# Patient Record
Sex: MALE | Race: WHITE | NOT HISPANIC OR LATINO | Employment: OTHER | URBAN - METROPOLITAN AREA
[De-identification: names, ages, dates, MRNs, and addresses within clinical notes are randomized per-mention and may not be internally consistent; named-entity substitution may affect disease eponyms.]

---

## 2017-01-18 ENCOUNTER — PATIENT MESSAGE (OUTPATIENT)
Dept: TRANSPLANT | Facility: CLINIC | Age: 52
End: 2017-01-18

## 2017-01-27 ENCOUNTER — TELEPHONE (OUTPATIENT)
Dept: TRANSPLANT | Facility: CLINIC | Age: 52
End: 2017-01-27

## 2017-01-27 ENCOUNTER — PATIENT MESSAGE (OUTPATIENT)
Dept: TRANSPLANT | Facility: CLINIC | Age: 52
End: 2017-01-27

## 2017-01-27 NOTE — TELEPHONE ENCOUNTER
Left message for call back to discuss when pt will have lab needed for switch to rapamune pending call back

## 2017-02-13 ENCOUNTER — PATIENT MESSAGE (OUTPATIENT)
Dept: TRANSPLANT | Facility: CLINIC | Age: 52
End: 2017-02-13

## 2017-02-17 ENCOUNTER — PATIENT MESSAGE (OUTPATIENT)
Dept: TRANSPLANT | Facility: CLINIC | Age: 52
End: 2017-02-17

## 2017-02-17 ENCOUNTER — TELEPHONE (OUTPATIENT)
Dept: TRANSPLANT | Facility: CLINIC | Age: 52
End: 2017-02-17

## 2017-02-20 LAB
EXT ALBUMIN: 4.3
EXT ALKALINE PHOSPHATASE: 60
EXT ALT: 24
EXT AST: 28
EXT BASOPHIL%: 1.1
EXT BILIRUBIN TOTAL: 0.8
EXT BUN: 16
EXT CALCIUM: 9.3
EXT CHLORIDE: 105
EXT CHOLESTEROL: 148
EXT CO2: 28
EXT CREATININE: 1.7 MG/DL
EXT EOSINOPHIL%: 1.6
EXT GFR MDRD NON AF AMER: 46
EXT GLUCOSE: 89
EXT HDL: 31
EXT HEMATOCRIT: 46.2
EXT HEMOGLOBIN: 15.7
EXT LDL CHOLESTEROL: 29
EXT LYMPH%: 26.3
EXT MONOCYTES%: 8.4
EXT PLATELETS: 144
EXT POTASSIUM: 5
EXT PROTEIN TOTAL: 7.1
EXT SEGS%: 62.6
EXT SODIUM: 139 MMOL/L
EXT TACROLIMUS LVL: 7.1
EXT TRIGLYCERIDES: 145
EXT WBC: 5.4

## 2017-02-23 ENCOUNTER — TELEPHONE (OUTPATIENT)
Dept: TRANSPLANT | Facility: CLINIC | Age: 52
End: 2017-02-23

## 2017-02-23 NOTE — TELEPHONE ENCOUNTER
----- Message from Mercedes Garcia MD sent at 2/21/2017  8:05 AM CST -----  The Labs are stable - please let patient know.

## 2017-02-23 NOTE — TELEPHONE ENCOUNTER
Labs reviewed. Did not receive pro/cre ratio from pt lab . Pt reports he will repeat lab next week, for possible switch to rapamune

## 2017-04-21 ENCOUNTER — PATIENT MESSAGE (OUTPATIENT)
Dept: TRANSPLANT | Facility: CLINIC | Age: 52
End: 2017-04-21

## 2017-04-24 LAB
EXT ALBUMIN: 4.7
EXT ALKALINE PHOSPHATASE: 66
EXT ALT: 25
EXT AST: 19
EXT BASOPHIL%: 0.9
EXT BILIRUBIN TOTAL: 0.7
EXT BUN: 15
EXT CALCIUM: 9.5
EXT CHLORIDE: 104
EXT CHOLESTEROL: 165
EXT CO2: 25
EXT CREATININE: 1.4 MG/DL
EXT EOSINOPHIL%: 1.6
EXT GLUCOSE: 80
EXT HDL: 41
EXT HEMATOCRIT: 48.3
EXT HEMOGLOBIN: 16.6
EXT LDL CHOLESTEROL: 99
EXT LYMPH%: 25.4
EXT MONOCYTES%: 6
EXT PLATELETS: 121
EXT POTASSIUM: 4.8
EXT PROT/CREAT RATIO UR: NORMAL
EXT PROTEIN TOTAL: 7
EXT SEGS%: 66.1
EXT SODIUM: 138 MMOL/L
EXT TACROLIMUS LVL: 5
EXT TRIGLYCERIDES: 126
EXT WBC: 5.9

## 2017-04-25 ENCOUNTER — PATIENT MESSAGE (OUTPATIENT)
Dept: TRANSPLANT | Facility: CLINIC | Age: 52
End: 2017-04-25

## 2017-05-03 ENCOUNTER — PATIENT MESSAGE (OUTPATIENT)
Dept: TRANSPLANT | Facility: CLINIC | Age: 52
End: 2017-05-03

## 2017-05-04 DIAGNOSIS — R79.89 ELEVATED LIVER FUNCTION TESTS: ICD-10-CM

## 2017-05-04 RX ORDER — TACROLIMUS 1 MG/1
3 CAPSULE ORAL EVERY 12 HOURS
Qty: 240 CAPSULE | Refills: 11 | Status: SHIPPED | OUTPATIENT
Start: 2017-05-04 | End: 2017-05-09 | Stop reason: SDUPTHER

## 2017-05-05 NOTE — TELEPHONE ENCOUNTER
----- Message from Mercedes Garcia MD sent at 4/30/2017 10:47 AM CDT -----  The Labs are stable - please let patient know.

## 2017-05-05 NOTE — TELEPHONE ENCOUNTER
Labs reviewed no changes needed  Will continue on prograf per Dr. Sargent will repeat per protocol. Letter sent  Will send message via portal

## 2017-05-08 ENCOUNTER — TELEPHONE (OUTPATIENT)
Dept: TRANSPLANT | Facility: CLINIC | Age: 52
End: 2017-05-08

## 2017-05-08 NOTE — TELEPHONE ENCOUNTER
----- Message from Shelia Lozano sent at 5/8/2017  4:04 PM CDT -----  Contact: patient   Patient wife needs someone to call pharmacy to answer a few questions please call [employer] medic[employer]  or call pt

## 2017-05-08 NOTE — TELEPHONE ENCOUNTER
Called this number per pt spouse request. Pt was thinking PA was needed. When I called no PA was on file.  Advised thatb they reach out to [employer] to find out what is needed. Will f/u in AM

## 2017-05-09 ENCOUNTER — PATIENT MESSAGE (OUTPATIENT)
Dept: TRANSPLANT | Facility: CLINIC | Age: 52
End: 2017-05-09

## 2017-05-09 DIAGNOSIS — R79.89 ELEVATED LIVER FUNCTION TESTS: ICD-10-CM

## 2017-05-09 NOTE — TELEPHONE ENCOUNTER
Dose verification pt taking prograf   3 mg in the morning and 2 mg at night [facility] has been notified

## 2017-05-10 ENCOUNTER — PATIENT MESSAGE (OUTPATIENT)
Dept: TRANSPLANT | Facility: CLINIC | Age: 52
End: 2017-05-10

## 2017-05-10 RX ORDER — TACROLIMUS 1 MG/1
CAPSULE ORAL
Qty: 150 CAPSULE | Refills: 11 | Status: SHIPPED | OUTPATIENT
Start: 2017-05-10 | End: 2020-07-10 | Stop reason: SDUPTHER

## 2017-05-11 ENCOUNTER — TELEPHONE (OUTPATIENT)
Dept: TRANSPLANT | Facility: CLINIC | Age: 52
End: 2017-05-11

## 2017-05-11 NOTE — TELEPHONE ENCOUNTER
----- Message from Maria Antonia Dewey sent at 5/11/2017 12:35 PM CDT -----  Contact: Rj with [facility]   Calling to give update pt's medicine tacrolimus (PROGRAF) 1 MG Cap had been approved and will be until the end of the year the authorization # is [id number] and if needed Rj can be reached at

## 2017-08-10 ENCOUNTER — PATIENT MESSAGE (OUTPATIENT)
Dept: TRANSPLANT | Facility: CLINIC | Age: 52
End: 2017-08-10

## 2017-08-10 ENCOUNTER — TELEPHONE (OUTPATIENT)
Dept: TRANSPLANT | Facility: CLINIC | Age: 52
End: 2017-08-10

## 2017-08-14 ENCOUNTER — PATIENT MESSAGE (OUTPATIENT)
Dept: TRANSPLANT | Facility: CLINIC | Age: 52
End: 2017-08-14

## 2017-08-16 LAB
EXT ALBUMIN: 4.4
EXT ALKALINE PHOSPHATASE: 66
EXT ALT: 43
EXT AST: 29
EXT BASOPHIL%: 1.3
EXT BILIRUBIN TOTAL: 0.7
EXT BUN: 12
EXT CALCIUM: 9.5
EXT CHLORIDE: 104
EXT CO2: 29
EXT CREATININE: 1.4 MG/DL
EXT EOSINOPHIL%: 1.4
EXT GFR MDRD AF AMER: 69
EXT GFR MDRD NON AF AMER: 57
EXT GLUCOSE: 89
EXT HEMATOCRIT: 46.7
EXT HEMOGLOBIN: 16.1
EXT LYMPH%: 31.4
EXT MONOCYTES%: 8.6
EXT PLATELETS: 118
EXT POTASSIUM: 4.9
EXT PROTEIN TOTAL: 7.2
EXT SEGS%: 57.3
EXT SODIUM: 140 MMOL/L
EXT TACROLIMUS LVL: 5.2
EXT WBC: 5.4

## 2017-08-18 ENCOUNTER — PATIENT MESSAGE (OUTPATIENT)
Dept: TRANSPLANT | Facility: CLINIC | Age: 52
End: 2017-08-18

## 2017-08-18 ENCOUNTER — TELEPHONE (OUTPATIENT)
Dept: TRANSPLANT | Facility: CLINIC | Age: 52
End: 2017-08-18

## 2017-08-18 NOTE — TELEPHONE ENCOUNTER
----- Message from Mercedes Garcia MD sent at 8/16/2017  8:40 PM CDT -----  Repeat labs in one month - please let patient know.

## 2017-08-21 ENCOUNTER — PATIENT MESSAGE (OUTPATIENT)
Dept: TRANSPLANT | Facility: CLINIC | Age: 52
End: 2017-08-21

## 2017-08-21 ENCOUNTER — TELEPHONE (OUTPATIENT)
Dept: TRANSPLANT | Facility: CLINIC | Age: 52
End: 2017-08-21

## 2017-08-22 ENCOUNTER — PATIENT MESSAGE (OUTPATIENT)
Dept: TRANSPLANT | Facility: CLINIC | Age: 52
End: 2017-08-22

## 2017-09-15 ENCOUNTER — PATIENT MESSAGE (OUTPATIENT)
Dept: TRANSPLANT | Facility: CLINIC | Age: 52
End: 2017-09-15

## 2017-09-15 LAB
EXT ALBUMIN: 4.3
EXT ALKALINE PHOSPHATASE: 60
EXT ALT: 37
EXT AST: 39
EXT BASOPHIL%: 1.1
EXT BILIRUBIN TOTAL: 0.7
EXT BUN: 26
EXT CALCIUM: 9.5
EXT CHLORIDE: 107
EXT CO2: 25
EXT CREATININE: 1.4 MG/DL
EXT EOSINOPHIL%: 1.3
EXT GFR MDRD AF AMER: 69
EXT GFR MDRD NON AF AMER: 57
EXT GLUCOSE: 80
EXT HEMATOCRIT: 45
EXT HEMOGLOBIN: 15.8
EXT LYMPH%: 27.1
EXT MONOCYTES%: 8.4
EXT PLATELETS: 145
EXT POTASSIUM: 4.8
EXT PROTEIN TOTAL: 6.6
EXT SEGS%: 62.1
EXT SODIUM: 138 MMOL/L
EXT TACROLIMUS LVL: 5.2
EXT WBC: 4.7

## 2017-09-18 ENCOUNTER — TELEPHONE (OUTPATIENT)
Dept: TRANSPLANT | Facility: CLINIC | Age: 52
End: 2017-09-18

## 2017-09-18 NOTE — TELEPHONE ENCOUNTER
----- Message from Mercedes Garcia MD sent at 9/17/2017 11:25 AM CDT -----  The Labs are stable - please let patient know.

## 2017-10-30 ENCOUNTER — OFFICE VISIT (OUTPATIENT)
Dept: TRANSPLANT | Facility: CLINIC | Age: 52
End: 2017-10-30
Payer: MEDICARE

## 2017-10-30 VITALS
BODY MASS INDEX: 29.56 KG/M2 | HEIGHT: 72 IN | TEMPERATURE: 98 F | SYSTOLIC BLOOD PRESSURE: 128 MMHG | OXYGEN SATURATION: 99 % | RESPIRATION RATE: 16 BRPM | WEIGHT: 218.25 LBS | DIASTOLIC BLOOD PRESSURE: 76 MMHG | HEART RATE: 57 BPM

## 2017-10-30 DIAGNOSIS — Z94.4 STATUS POST LIVER TRANSPLANT: ICD-10-CM

## 2017-10-30 DIAGNOSIS — Z29.89 PROPHYLACTIC IMMUNOTHERAPY: ICD-10-CM

## 2017-10-30 DIAGNOSIS — Z94.4 S/P LIVER TRANSPLANT: Primary | ICD-10-CM

## 2017-10-30 DIAGNOSIS — I15.8 OTHER SECONDARY HYPERTENSION: Chronic | ICD-10-CM

## 2017-10-30 DIAGNOSIS — R79.89 ELEVATED LIVER FUNCTION TESTS: ICD-10-CM

## 2017-10-30 DIAGNOSIS — Z79.52 LONG TERM CURRENT USE OF SYSTEMIC STEROIDS: ICD-10-CM

## 2017-10-30 PROCEDURE — 99999 PR PBB SHADOW E&M-EST. PATIENT-LVL IV: CPT | Mod: PBBFAC,,, | Performed by: INTERNAL MEDICINE

## 2017-10-30 PROCEDURE — 99215 OFFICE O/P EST HI 40 MIN: CPT | Mod: S$PBB,,, | Performed by: INTERNAL MEDICINE

## 2017-10-30 PROCEDURE — 99214 OFFICE O/P EST MOD 30 MIN: CPT | Mod: PBBFAC | Performed by: INTERNAL MEDICINE

## 2017-10-30 RX ORDER — MYCOPHENOLATE MOFETIL 250 MG/1
500 CAPSULE ORAL 2 TIMES DAILY
Qty: 240 CAPSULE | Refills: 11 | Status: SHIPPED | OUTPATIENT
Start: 2017-10-30 | End: 2020-07-10 | Stop reason: SDUPTHER

## 2017-10-30 NOTE — PATIENT INSTRUCTIONS
1. Colonoscopy 2016 with 3 small polyps- repeat 2019  2. Bone density this year, 2017  3. Dermatologist this year 2017- precancerous lesions- return in 2018  4. Lower cellcept to 750 mg twice daily x 4 weeks then lower to 500 mg twice daily and then hold. Do labs monthly.  Return 1 year

## 2017-10-30 NOTE — PROGRESS NOTES
Transplant Hepatology  Liver Transplant Recipient Follow-up    Transplant Date: 2013  UNOS Native Liver Dx: Alcoholic Cirrhosis    Domenico is here for follow up of his liver transplant.    ORGAN: LIVER  Whole or Partial: whole liver  Donor Type:  - brain death  CDC High Risk: no  Donor CMV Status: positive  Donor HCV Status: negative  Donor HBcAb: negative  Biliary Anastomosis: end to end  Arterial Anatomy: standard  IVC reconstruction: end to end ivc  Portal vein status: patent    He has had the following complications since transplant: renal insufficiency.     Subjective:     Interval History: Domenico is now 4 years post liver transplant. Currently, he is doing well. He feels well overall. He did fracture his ankle in the past year but did not need surgery. During his recovery he gained a lot of weight. He was up to 243 lbs and is now down to 218. He had a bone density in  that was normal. He saw a dermatologist and had precancerous lesion removed from his face. He had a colonoscopy in 2016 and had 3 small polyps removed. A repeat is recommended in 2019..    He is not drinking alcohol. His liver tests were very low (ALT 25, AST 19) and then increased to 43/29. Most recnetly they were 37/39. He is maintained of prograf with a trough ~5 and cellcept 1000 mg bid. I have been hesitant to lower it because he intermittently has elevated liver tests.    His creatinine is fluctuating but now is low at 1.4. He is seeing a local nephrologist.    Review of Systems   Constitutional: Negative.    HENT: Negative.    Eyes: Negative.    Respiratory: Negative.    Cardiovascular: Negative.    Gastrointestinal: Negative.    Genitourinary: Negative.    Musculoskeletal: Negative.    Skin: Negative.    Neurological: Negative.    Psychiatric/Behavioral: Negative.        Objective:     Physical Exam   Constitutional: He is oriented to person, place, and time. He appears well-developed and well-nourished.    HENT:   Head: Normocephalic and atraumatic.   Eyes: Conjunctivae and EOM are normal. Pupils are equal, round, and reactive to light. No scleral icterus.   Neck: Normal range of motion. Neck supple. No thyromegaly present.   Cardiovascular: Normal rate, regular rhythm and normal heart sounds.    Pulmonary/Chest: Effort normal and breath sounds normal. He has no rales.   Abdominal: Soft. Bowel sounds are normal. He exhibits no distension and no mass. There is no tenderness.   Musculoskeletal: Normal range of motion. He exhibits no edema.   Neurological: He is alert and oriented to person, place, and time.   Skin: Skin is warm and dry. No rash noted.   Psychiatric: He has a normal mood and affect.   Vitals reviewed.    Lab Results   Component Value Date    BILITOT 0.5 06/15/2015    AST 38 01/25/2016    ALT 37 01/25/2016    ALKPHOS 104 06/15/2015    CREATININE 1.9 01/25/2016    ALBUMIN 4.1 01/25/2016     Lab Results   Component Value Date    WBC 5.2 01/25/2016    HGB 16.2 01/25/2016    HCT 47 01/25/2016    HCT 26 (L) 06/05/2013     (L) 06/15/2015     Lab Results   Component Value Date    TACROLIMUS 6.7 06/15/2015       Assessment/Plan:     No diagnosis found.Current recommendations:  1. Complication of liver transplant: CRI, ongoing: continue to minimize prograf. Previously have considered changing to rapamune, but this was deferred since his liver enzymes have been elevated on several occasions. target prograf level 3-5.  3. HTN, ongoing: continue antihypertensives  5. S/p liver transplant and control of IS: continue current dose of pg but will lower cellcept especially since he had a precancerous skin lesion on his face. I have asked him to take 750 mg bid x 4 weeks and then 500 mg bid. He should do monthly labs x 6 months.  6. Health maintenance: the patient should continue to see a dermatologist annually to screen for skin cancer. He should repeat a colonoscopy in 2019.  7. R/O osteoporosis. Repeat bone  density now (2017).  8. Alcohol abuse in remission: check peth test periodically    Return one year..    MD ORQUIDEA Coffey Patient Status  Functional Status: 100% - Normal, no complaints, no evidence of disease  Physical Capacity: No Limitations    . .

## 2017-10-30 NOTE — LETTER
October 30, 2017        Ron Saeed III  [address]  [facility] [address]  Phone: [phone]  Fax: [phone]             [facility]  [address]  [facility] [address]  Phone: [phone]   Patient: Domenico Trevino   MR Number: [id number]   YOB: 1965   Date of Visit: 10/30/2017       Dear Dr. Ron Saeed III    Thank you for referring Domenico Trevino to me for evaluation. Attached you will find relevant portions of my assessment and plan of care.    If you have questions, please do not hesitate to call me. I look forward to following Domenico Trevino along with you.    Sincerely,    Mercedes Garcia MD    Enclosure    If you would like to receive this communication electronically, please contact [email or web address] or [phone] to request [employer] access.    [employer] is a tool which provides read-only access to select patient information with whom you have a relationship. Its easy to use and provides real time access to review your patients record including encounter summaries, notes, results, and demographic information.    If you feel you have received this communication in error or would no longer like to receive these types of communications, please e-mail [email or web address]

## 2017-12-15 LAB
EXT ALBUMIN: 4.1
EXT ALKALINE PHOSPHATASE: 50
EXT ALT: 27
EXT AST: 19
EXT BASOPHIL%: 1.3
EXT BILIRUBIN TOTAL: 0.8
EXT BUN: 13
EXT CALCIUM: 9.2
EXT CHLORIDE: 104
EXT CO2: 28
EXT CREATININE: 1.4 MG/DL
EXT EOSINOPHIL%: 1.2
EXT GLUCOSE: 83
EXT HEMATOCRIT: 44.3
EXT HEMOGLOBIN: 15.3
EXT LYMPH%: 27.2
EXT MONOCYTES%: 7.8
EXT PLATELETS: 146
EXT POTASSIUM: 5.2
EXT PROTEIN TOTAL: 6.2
EXT SEGS%: 62.5
EXT SODIUM: 140 MMOL/L
EXT TACROLIMUS LVL: 5.4
EXT WBC: 4.8

## 2017-12-20 ENCOUNTER — TELEPHONE (OUTPATIENT)
Dept: TRANSPLANT | Facility: CLINIC | Age: 52
End: 2017-12-20

## 2017-12-20 NOTE — TELEPHONE ENCOUNTER
----- Message from Mercedes Garcia MD sent at 12/20/2017  3:08 PM CST -----  The Labs are stable - please let patient know.

## 2018-01-08 ENCOUNTER — PATIENT MESSAGE (OUTPATIENT)
Dept: TRANSPLANT | Facility: CLINIC | Age: 53
End: 2018-01-08

## 2018-02-16 LAB
EXT ALBUMIN: 4.5
EXT ALKALINE PHOSPHATASE: 54
EXT ALT: 26
EXT AST: 20
EXT BASOPHIL%: 0.7
EXT BILIRUBIN TOTAL: 0.7
EXT BUN: 19
EXT CALCIUM: 9.4
EXT CHLORIDE: 107
EXT CO2: 26
EXT CREATININE: 1.3 MG/DL
EXT EOSINOPHIL%: 2.3
EXT GFR MDRD AF AMER: >60
EXT GFR MDRD NON AF AMER: >60
EXT GGT: 24
EXT GLUCOSE: 82
EXT HEMATOCRIT: 46.6
EXT HEMOGLOBIN: 15.9
EXT LYMPH%: 25
EXT MONOCYTES%: 7.2
EXT PLATELETS: 132
EXT POTASSIUM: 4.8
EXT PROTEIN TOTAL: 6.5
EXT SEGS%: 64.8
EXT SODIUM: 140 MMOL/L
EXT TACROLIMUS LVL: 3.7
EXT WBC: 4.4

## 2018-02-21 ENCOUNTER — TELEPHONE (OUTPATIENT)
Dept: TRANSPLANT | Facility: CLINIC | Age: 53
End: 2018-02-21

## 2018-02-21 NOTE — TELEPHONE ENCOUNTER
----- Message from Mercedes Garcia MD sent at 2/17/2018  5:21 PM CST -----  The Labs are stable - please let patient know.

## 2018-03-26 ENCOUNTER — TELEPHONE (OUTPATIENT)
Dept: TRANSPLANT | Facility: CLINIC | Age: 53
End: 2018-03-26

## 2018-04-04 ENCOUNTER — TELEPHONE (OUTPATIENT)
Dept: TRANSPLANT | Facility: CLINIC | Age: 53
End: 2018-04-04

## 2018-04-04 NOTE — TELEPHONE ENCOUNTER
Called pt no answer called pt lab lab states pt has not been will send miss lab letter and schedule pt for 2 weeks out

## 2018-04-04 NOTE — LETTER
April 4, 2018    Domenico Trevino  [address] [facility] [address]          Dear Domenico Trevino:  MRN: [id number]    Your lab work was due to be drawn on 04/02/2018.  We contacted your lab and were unable to get test results.  It is very important to get your labs drawn as scheduled.  We cannot monitor you for rejection, infections, or drug toxicity side effects without lab results.  Please call us at [phone] as soon as possible to let us know when you plan to have labs drawn.    Sincerely,        Your Liver Transplant Coordinator    [facility]  [address] [place] [address]  [phone]

## 2018-04-05 LAB
EXT ALBUMIN: 4.4
EXT ALKALINE PHOSPHATASE: 71
EXT ALT: 25
EXT AST: 19
EXT BASOPHIL%: 1.4
EXT BILIRUBIN TOTAL: 0.5
EXT BUN: 19
EXT CALCIUM: 9
EXT CHLORIDE: 105
EXT CO2: 28
EXT CREATININE: 1.5 MG/DL
EXT EOSINOPHIL%: 2.7
EXT GLUCOSE: 91
EXT HEMATOCRIT: 45.6
EXT HEMOGLOBIN: 15.9
EXT LYMPH%: 28.8
EXT MONOCYTES%: 7.2
EXT PLATELETS: 130
EXT POTASSIUM: 4.9
EXT PROTEIN TOTAL: 6.8
EXT SEGS%: 59.9
EXT SODIUM: 136 MMOL/L
EXT TACROLIMUS LVL: 3.7
EXT WBC: 5.4

## 2018-04-09 ENCOUNTER — TELEPHONE (OUTPATIENT)
Dept: TRANSPLANT | Facility: CLINIC | Age: 53
End: 2018-04-09

## 2018-04-09 NOTE — TELEPHONE ENCOUNTER
----- Message from Mercedes Garcia MD sent at 4/8/2018  4:48 PM CDT -----  The Labs are stable - please let patient know.

## 2018-05-31 LAB — EXT WBC: NORMAL

## 2018-06-25 ENCOUNTER — PATIENT MESSAGE (OUTPATIENT)
Dept: TRANSPLANT | Facility: CLINIC | Age: 53
End: 2018-06-25

## 2018-07-09 ENCOUNTER — PATIENT MESSAGE (OUTPATIENT)
Dept: TRANSPLANT | Facility: CLINIC | Age: 53
End: 2018-07-09

## 2018-07-24 ENCOUNTER — PATIENT MESSAGE (OUTPATIENT)
Dept: TRANSPLANT | Facility: CLINIC | Age: 53
End: 2018-07-24

## 2018-07-24 ENCOUNTER — TELEPHONE (OUTPATIENT)
Dept: TRANSPLANT | Facility: CLINIC | Age: 53
End: 2018-07-24

## 2018-07-24 NOTE — TELEPHONE ENCOUNTER
Pt has been called no answer and unable to leave VM. I have sent pt a portal message to go have labs drawn.

## 2018-07-26 ENCOUNTER — PATIENT MESSAGE (OUTPATIENT)
Dept: TRANSPLANT | Facility: CLINIC | Age: 53
End: 2018-07-26

## 2018-07-26 LAB
EXT ALBUMIN: 4.2
EXT ALKALINE PHOSPHATASE: 60
EXT ALT: 28
EXT AST: 21
EXT BASOPHIL%: 1.2
EXT BILIRUBIN TOTAL: 0.4
EXT BUN: 12
EXT CALCIUM: 9.3
EXT CHLORIDE: 104
EXT CO2: 28
EXT CREATININE: 1.2 MG/DL
EXT EOSINOPHIL%: 1.2
EXT GLUCOSE: 81
EXT HEMATOCRIT: 45.4
EXT HEMOGLOBIN: 15.7
EXT LYMPH%: 25.2
EXT MONOCYTES%: 7.1
EXT PLATELETS: 174
EXT POTASSIUM: 4.4
EXT PROTEIN TOTAL: 6.6
EXT SEGS%: 65.3
EXT SODIUM: 139 MMOL/L
EXT TACROLIMUS LVL: 3.9
EXT WBC: 5.2

## 2018-08-02 ENCOUNTER — PATIENT MESSAGE (OUTPATIENT)
Dept: TRANSPLANT | Facility: CLINIC | Age: 53
End: 2018-08-02

## 2018-08-03 ENCOUNTER — TELEPHONE (OUTPATIENT)
Dept: TRANSPLANT | Facility: CLINIC | Age: 53
End: 2018-08-03

## 2018-08-03 NOTE — TELEPHONE ENCOUNTER
----- Message from Alisha Rodrigez MD sent at 8/2/2018  8:36 PM CDT -----  Liver tests normal, prograf adequate.  No change in dosing and repeat labs per protocol

## 2018-11-09 ENCOUNTER — TELEPHONE (OUTPATIENT)
Dept: TRANSPLANT | Facility: CLINIC | Age: 53
End: 2018-11-09

## 2018-11-27 ENCOUNTER — PATIENT MESSAGE (OUTPATIENT)
Dept: TRANSPLANT | Facility: CLINIC | Age: 53
End: 2018-11-27

## 2018-11-28 ENCOUNTER — TELEPHONE (OUTPATIENT)
Dept: TRANSPLANT | Facility: CLINIC | Age: 53
End: 2018-11-28

## 2018-11-28 ENCOUNTER — PATIENT MESSAGE (OUTPATIENT)
Dept: TRANSPLANT | Facility: CLINIC | Age: 53
End: 2018-11-28

## 2018-11-28 LAB
EXT ALBUMIN: 4.6
EXT ALKALINE PHOSPHATASE: 66
EXT ALT: 29
EXT AST: 21
EXT BASOPHIL%: 0.9
EXT BILIRUBIN TOTAL: 1
EXT BUN: 18
EXT CALCIUM: 9.6
EXT CHLORIDE: 103
EXT CO2: 27
EXT CREATININE: 1.25 MG/DL
EXT EOSINOPHIL%: 1.7
EXT GFR MDRD AF AMER: >60
EXT GFR MDRD NON AF AMER: >60
EXT GGT: 30
EXT GLUCOSE: 89
EXT HEMATOCRIT: 48.4
EXT HEMOGLOBIN: 16.5
EXT LYMPH%: 23.8
EXT MONOCYTES%: 6.6
EXT PLATELETS: 169
EXT POTASSIUM: 5.6
EXT PROTEIN TOTAL: 7.3
EXT SEGS%: 67
EXT SODIUM: 139 MMOL/L
EXT TACROLIMUS LVL: 3.7
EXT WBC: 4.9

## 2018-11-28 NOTE — TELEPHONE ENCOUNTER
----- Message from Mercedes Garcia MD sent at 11/28/2018 11:55 AM CST -----  The Labs are stable - please let patient know.

## 2019-02-15 ENCOUNTER — PATIENT MESSAGE (OUTPATIENT)
Dept: TRANSPLANT | Facility: CLINIC | Age: 54
End: 2019-02-15

## 2019-03-27 ENCOUNTER — PATIENT MESSAGE (OUTPATIENT)
Dept: TRANSPLANT | Facility: CLINIC | Age: 54
End: 2019-03-27

## 2019-03-27 LAB
EXT ALBUMIN: 4.6
EXT ALKALINE PHOSPHATASE: 74
EXT ALT: 37
EXT AST: 46
EXT BASOPHIL%: 1
EXT BILIRUBIN TOTAL: 0.5
EXT BUN: 18
EXT CALCIUM: 9.3
EXT CHLORIDE: 106
EXT CHOLESTEROL: 176
EXT CO2: 24
EXT CREATININE: 1.47 MG/DL
EXT EOSINOPHIL%: 3
EXT GFR MDRD NON AF AMER: 54
EXT GLUCOSE: 90
EXT HDL: 42
EXT HEMATOCRIT: 46.6
EXT HEMOGLOBIN: 15.3
EXT LDL CHOLESTEROL: 98
EXT LYMPH%: 24
EXT MAGNESIUM: 2
EXT MONOCYTES%: 10
EXT PLATELETS: 191
EXT POTASSIUM: 4.4
EXT PROTEIN TOTAL: 6.8
EXT SEGS%: 62
EXT SODIUM: 144 MMOL/L
EXT TACROLIMUS LVL: 2.7
EXT TRIGLYCERIDES: 180
EXT WBC: 5.4

## 2019-03-28 ENCOUNTER — TELEPHONE (OUTPATIENT)
Dept: TRANSPLANT | Facility: CLINIC | Age: 54
End: 2019-03-28

## 2019-03-28 NOTE — TELEPHONE ENCOUNTER
----- Message from Mercedes Garcia MD sent at 3/28/2019  8:28 AM CDT -----  Repeat labs - please let patient know.

## 2019-05-24 ENCOUNTER — PATIENT MESSAGE (OUTPATIENT)
Dept: TRANSPLANT | Facility: CLINIC | Age: 54
End: 2019-05-24

## 2019-05-24 ENCOUNTER — TELEPHONE (OUTPATIENT)
Dept: TRANSPLANT | Facility: CLINIC | Age: 54
End: 2019-05-24

## 2019-05-28 LAB
EXT ALBUMIN: 4.1
EXT ALKALINE PHOSPHATASE: 52
EXT ALT: 32
EXT AST: 23
EXT BASOPHIL%: 1.4
EXT BILIRUBIN TOTAL: 0.5
EXT BUN: 17
EXT CALCIUM: 8.8
EXT CHLORIDE: 105
EXT CO2: 24
EXT CREATININE: 1.41 MG/DL
EXT EOSINOPHIL%: 1.5
EXT GLUCOSE: 86
EXT HEMATOCRIT: 49.1
EXT HEMOGLOBIN: 16.5
EXT LYMPH%: 22
EXT MAGNESIUM: 2.1
EXT MONOCYTES%: 7.2
EXT PLATELETS: 202
EXT POTASSIUM: 4.8
EXT PROTEIN TOTAL: 6.6
EXT SEGS%: 67.9
EXT SODIUM: 139 MMOL/L
EXT TACROLIMUS LVL: 4.5
EXT WBC: 5.6

## 2019-05-31 ENCOUNTER — TELEPHONE (OUTPATIENT)
Dept: TRANSPLANT | Facility: CLINIC | Age: 54
End: 2019-05-31

## 2019-05-31 NOTE — TELEPHONE ENCOUNTER
----- Message from Alisha Rodrigez MD sent at 5/31/2019 10:56 AM CDT -----  Liver tests normal, prograf adequate.  Repeat labs per protocol

## 2019-08-17 ENCOUNTER — PATIENT MESSAGE (OUTPATIENT)
Dept: TRANSPLANT | Facility: CLINIC | Age: 54
End: 2019-08-17

## 2019-10-23 ENCOUNTER — TELEPHONE (OUTPATIENT)
Dept: TRANSPLANT | Facility: CLINIC | Age: 54
End: 2019-10-23

## 2019-10-23 NOTE — LETTER
October 23, 2019    Domenico Trevino  [address] [facility] [address]          Dear Domenico Trevino:  MRN: [id number]    Your lab work was due to be drawn on 09/30/2019.  We contacted your lab and were unable to get test results.  It is very important to get your labs drawn as scheduled.  We cannot monitor you for rejection, infections, or drug toxicity side effects without lab results.  Please call us at [phone] as soon as possible to let us know when you plan to have labs drawn.    Sincerely,        Your Liver Transplant Coordinator    [facility]  [address] [place] [address]  [phone]

## 2019-10-23 NOTE — TELEPHONE ENCOUNTER
Pleased advise I have called both labs ( [facility] ) both states pt has not been to lab since may 2019. Tried calling pt unable to reach him and can not leave a VM will send out missed lab letter and reminder card for December.

## 2019-11-06 LAB
EXT ALBUMIN: 4
EXT ALKALINE PHOSPHATASE: 94
EXT ALT: 25
EXT AST: 18
EXT BASOPHIL%: 1.3
EXT BILIRUBIN TOTAL: 0.5
EXT BUN: 15
EXT CALCIUM: 9.4
EXT CHLORIDE: 105
EXT CO2: 28
EXT CREATININE: 1.34 MG/DL
EXT EOSINOPHIL%: 2.3
EXT GGT: 29
EXT GLUCOSE: 94
EXT HEMATOCRIT: 44.9
EXT HEMOGLOBIN: 15.8
EXT LYMPH%: 23.2
EXT MONOCYTES%: 8.6
EXT PLATELETS: 172
EXT POTASSIUM: 4.9
EXT PROTEIN TOTAL: 7
EXT SEGS%: 66.6
EXT SODIUM: 139 MMOL/L
EXT TACROLIMUS LVL: 3.4
EXT WBC: 5.4

## 2019-11-07 ENCOUNTER — PATIENT MESSAGE (OUTPATIENT)
Dept: TRANSPLANT | Facility: CLINIC | Age: 54
End: 2019-11-07

## 2019-11-08 ENCOUNTER — TELEPHONE (OUTPATIENT)
Dept: TRANSPLANT | Facility: CLINIC | Age: 54
End: 2019-11-08

## 2019-11-08 NOTE — TELEPHONE ENCOUNTER
----- Message from Mercedes Garcia MD sent at 11/6/2019  7:29 PM CST -----  The Labs are stable - please let patient know.

## 2019-11-08 NOTE — TELEPHONE ENCOUNTER
Sara Trevino,    Dr Garcia reviewed your labs and they are stable. No medication changes. Next labs due 2/10/2020.  '    Happy Holidays,    Jennifer RN

## 2019-11-08 NOTE — LETTER
November 8, 2019    Domenico Trevino  [address] [facility] [address]          Dear Domenico Trevino:  MRN: [id number]    This is a follow up to your recent labs, your lab results were stable.  There are no medicine changes.  Please have your labs drawn again on 2/10/2020.      If you cannot have your labs drawn on the scheduled date, it is your responsibility to call the transplant department to reschedule.  To reschedule or make an appointment, please as to speak to or leave a message for my assistant, Jackie Spence or Jennifer, at [phone].  When leaving a message for Jackie Spence Angela or myself, we ask that you leave a brief message regarding your request.    Sincerely,      Jennifer Pittman, RN, BSN, CCTC  Your Liver Transplant Coordinator    [facility]  [address] [place] [address]  [phone]

## 2020-01-15 ENCOUNTER — NURSE TRIAGE (OUTPATIENT)
Dept: ADMINISTRATIVE | Facility: CLINIC | Age: 55
End: 2020-01-15

## 2020-01-16 NOTE — TELEPHONE ENCOUNTER
Reason for Disposition   [1] Depression AND [2] unable to do any of normal activities (e.g., self care, school, work; in comparison to baseline).    Additional Information   Negative: Patient attempted suicide   Negative: Patient is threatening suicide now   Negative: Violent behavior, or threatening to physically hurt or kill someone   Negative: [1] Patient is very confused (disoriented, slurred speech) AND [2] no other adult (e.g., friend or family member) available   Negative: [1] Difficult to awaken or acting very confused (disoriented, slurred speech) AND [2] new onset   Negative: Sounds like a life-threatening emergency to the triager    Protocols used: DEPRESSION-A-AH    Liver transplant 6/5/2013, no bpa    Severely depressed and taking too much trazadone (doubling and tripling doses to sleep). His pcp is no longer seeing his patients and the wait in the area to see a mental health provider is backed up until 5/2020. Hospitals in the [place] are crowded with the flu patients and he could not wait more than 5 hours today although he tried. She states he was not making sense the last couple of days and falling and complaining he cannot sleep. Mrs. Richards is guarding the medications to deny him access until it is time for his usual doses.  Mrs. Trevino was advised to go back to the ED with Mr. Trevino and she was given the number to the suicide prevention hotline.

## 2020-02-17 ENCOUNTER — TELEPHONE (OUTPATIENT)
Dept: TRANSPLANT | Facility: CLINIC | Age: 55
End: 2020-02-17

## 2020-02-19 ENCOUNTER — TELEPHONE (OUTPATIENT)
Dept: TRANSPLANT | Facility: CLINIC | Age: 55
End: 2020-02-19

## 2020-02-19 NOTE — LETTER
February 19, 2020    Domenico Trevino  [address] [facility] [address]          Dear Domenico Trevino:  MRN: [id number]    Your lab work was due to be drawn on 2/10/2020.  We contacted your lab and were unable to get test results.  It is very important to get your labs drawn as scheduled.  We cannot monitor you for rejection, infections, or drug toxicity side effects without lab results.  Please call us at [phone] as soon as possible to let us know when you plan to have labs drawn. Please call Jennifer Guzmán at the above number to let us know when you will be completing labs.    Sincerely,      Jennifer Pittman, RN, BSN, CCTC  Your Liver Transplant Coordinator    [facility]  [address] [place] [address]  [phone]

## 2020-02-20 ENCOUNTER — PATIENT MESSAGE (OUTPATIENT)
Dept: TRANSPLANT | Facility: CLINIC | Age: 55
End: 2020-02-20

## 2020-02-21 LAB
EXT ALBUMIN: 4.4
EXT ALKALINE PHOSPHATASE: 86
EXT ALT: 31
EXT AST: 22
EXT BASOPHIL%: 0.7
EXT BILIRUBIN TOTAL: 0.8
EXT BUN: 18
EXT CALCIUM: 10
EXT CHLORIDE: 106
EXT CO2: 26
EXT CREATININE: 1.61 MG/DL
EXT EOSINOPHIL%: 1.3
EXT GGT: 43
EXT GLUCOSE: 90
EXT HEMATOCRIT: 43.5
EXT HEMOGLOBIN: 15.4
EXT LYMPH%: 14.8
EXT MONOCYTES%: 7.4
EXT PLATELETS: 184
EXT POTASSIUM: 4.6
EXT PROTEIN TOTAL: 7.6
EXT SEGS%: 75.8
EXT SODIUM: 139 MMOL/L
EXT TACROLIMUS LVL: 2.5
EXT WBC: 8.8

## 2020-03-05 ENCOUNTER — TELEPHONE (OUTPATIENT)
Dept: TRANSPLANT | Facility: CLINIC | Age: 55
End: 2020-03-05

## 2020-03-05 NOTE — TELEPHONE ENCOUNTER
----- Message from Rakesh Henry MD sent at 2/21/2020 12:45 PM CST -----  Labs stable, tacrolimus level is pending. Please let me know when tacrolimus level is back.

## 2020-03-05 NOTE — TELEPHONE ENCOUNTER
Sara Trevino,    Dr Henry reviewed your labs and they are stable. No medication changes. Next labs due Tuesday, 5/26/20.    Thanks,    Jennifer

## 2020-03-05 NOTE — LETTER
March 5, 2020    Domenico Trevino  [address] [facility] [address]          Dear Domenico Trevino:  MRN: [id number]    This is a follow up to your recent labs, your lab results were stable.  There are no medicine changes.  Please have your labs drawn again on Tuesday, 5/26/2020.      If you cannot have your labs drawn on the scheduled date, it is your responsibility to call the transplant department to reschedule.  To reschedule or make an appointment, please as to speak to or leave a message for my assistant, Jackie Spence or Jennifer, at [phone].  When leaving a message for Jackie Spence Angela or myself, we ask that you leave a brief message regarding your request.    Sincerely,      Jennifer Pittman, RN, BSN, CCTC  Your Liver Transplant Coordinator    [facility]  [address] [place] [address]  [phone]

## 2020-03-05 NOTE — TELEPHONE ENCOUNTER
----- Message from Rakesh Henry MD sent at 2/27/2020  2:41 PM CST -----  No change. Tacro level is low but would not increase as he has been on MMF and renal function is declining.

## 2020-03-12 ENCOUNTER — PATIENT MESSAGE (OUTPATIENT)
Dept: TRANSPLANT | Facility: CLINIC | Age: 55
End: 2020-03-12

## 2020-05-28 ENCOUNTER — TELEPHONE (OUTPATIENT)
Dept: TRANSPLANT | Facility: CLINIC | Age: 55
End: 2020-05-28

## 2020-06-09 ENCOUNTER — TELEPHONE (OUTPATIENT)
Dept: TRANSPLANT | Facility: CLINIC | Age: 55
End: 2020-06-09

## 2020-06-11 LAB
EXT ALBUMIN: 4.3
EXT ALKALINE PHOSPHATASE: 73
EXT ALT: 26
EXT AST: 17
EXT BASOPHIL%: 1.3
EXT BILIRUBIN TOTAL: 0.5
EXT BUN: 18
EXT CALCIUM: 9.9
EXT CHLORIDE: 106
EXT CO2: 23
EXT CREATININE: 1.39 MG/DL
EXT EOSINOPHIL%: 1.4
EXT GGT: 39
EXT GLUCOSE: 92
EXT HEMATOCRIT: 43
EXT HEMOGLOBIN: 15.1
EXT LYMPH%: 24.7
EXT MONOCYTES%: 8.6
EXT PLATELETS: 187
EXT POTASSIUM: 4.6
EXT PROTEIN TOTAL: 7.5
EXT SEGS%: 64
EXT SODIUM: 140 MMOL/L
EXT TACROLIMUS LVL: 4.3
EXT WBC: 5.3

## 2020-06-17 ENCOUNTER — TELEPHONE (OUTPATIENT)
Dept: TRANSPLANT | Facility: CLINIC | Age: 55
End: 2020-06-17

## 2020-06-17 NOTE — LETTER
June 17, 2020    Domenico Trevino  [address] [facility] [address]          Dear Domenico Trevino:  MRN: [id number]    This is a follow up to your recent labs, your lab results were stable.  There are no medicine changes.  Please have your labs drawn again on 9/14/2020.      If you cannot have your labs drawn on the scheduled date, it is your responsibility to call the transplant department to reschedule.  Please call [phone] and ask to speak to Jennifer LEACH -  for all scheduling requests.     Sincerely,      Jennifer Pittman, BSN, RN ,CCTC  Your Liver Transplant Coordinator    [facility]  [address] [place] [address]  [phone]

## 2020-06-17 NOTE — TELEPHONE ENCOUNTER
----- Message from Jean Santos MD sent at 6/16/2020 12:39 PM CDT -----  Results reviewed. Please advise labs are stable.

## 2020-06-17 NOTE — TELEPHONE ENCOUNTER
----- Message from Jean Santos MD sent at 6/16/2020 12:38 PM CDT -----  Results reviewed. Please advise labs are stable.

## 2020-07-10 DIAGNOSIS — R79.89 ELEVATED LIVER FUNCTION TESTS: ICD-10-CM

## 2020-07-10 DIAGNOSIS — Z94.4 STATUS POST LIVER TRANSPLANT: ICD-10-CM

## 2020-07-10 RX ORDER — MYCOPHENOLATE MOFETIL 250 MG/1
500 CAPSULE ORAL 2 TIMES DAILY
Qty: 240 CAPSULE | Refills: 11 | Status: SHIPPED | OUTPATIENT
Start: 2020-07-10 | End: 2021-11-29 | Stop reason: SDUPTHER

## 2020-07-10 RX ORDER — TACROLIMUS 1 MG/1
CAPSULE ORAL
Qty: 150 CAPSULE | Refills: 11 | Status: SHIPPED | OUTPATIENT
Start: 2020-07-10 | End: 2021-01-28 | Stop reason: SDUPTHER

## 2020-09-18 ENCOUNTER — TELEPHONE (OUTPATIENT)
Dept: TRANSPLANT | Facility: CLINIC | Age: 55
End: 2020-09-18

## 2020-09-18 ENCOUNTER — PATIENT MESSAGE (OUTPATIENT)
Dept: TRANSPLANT | Facility: CLINIC | Age: 55
End: 2020-09-18
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
## 2020-11-03 ENCOUNTER — TELEPHONE (OUTPATIENT)
Dept: TRANSPLANT | Facility: CLINIC | Age: 55
End: 2020-11-03

## 2020-11-06 ENCOUNTER — PATIENT MESSAGE (OUTPATIENT)
Dept: TRANSPLANT | Facility: CLINIC | Age: 55
End: 2020-11-06

## 2020-11-06 ENCOUNTER — TELEPHONE (OUTPATIENT)
Dept: TRANSPLANT | Facility: CLINIC | Age: 55
End: 2020-11-06

## 2020-11-06 LAB
EXT ALBUMIN: 4.4
EXT ALKALINE PHOSPHATASE: 87
EXT ALT: 31
EXT AST: 26
EXT BASOPHIL%: 1
EXT BILIRUBIN TOTAL: 0.9
EXT BUN: 18
EXT CALCIUM: 9.4
EXT CHLORIDE: 103
EXT CO2: 25
EXT CREATININE: 1.44 MG/DL
EXT EOSINOPHIL%: 1
EXT GGT: 63
EXT GLUCOSE: 91
EXT HEMATOCRIT: 48.1
EXT HEMOGLOBIN: 17
EXT LYMPH%: 28
EXT MONOCYTES%: 9
EXT PLATELETS: 167
EXT POTASSIUM: 4.7
EXT PROTEIN TOTAL: 7.5
EXT SEGS%: ABNORMAL
EXT SODIUM: 138 MMOL/L
EXT TACROLIMUS LVL: 3
EXT WBC: 5.4

## 2020-11-06 NOTE — LETTER
November 6, 2020    Domenico Trevino  [address] [facility] [address]          Dear Domenico Trevino:  MRN: [id number]    This is a follow up to your recent labs, your lab results were stable.  There are no medicine changes.  Please have your labs drawn again on 01/18/21.      If you cannot have your labs drawn on the scheduled date, it is your responsibility to call the transplant department to reschedule.  Please call [phone] and ask to speak to Jennifer LEACH -  for all scheduling requests.     Sincerely,        Your Liver Transplant Coordinator    [facility]  [address] [place] [address]  [phone]

## 2020-11-06 NOTE — TELEPHONE ENCOUNTER
Continue routine labs no changes needed.  Letter sent for next lab appointment 01/18/21      ----- Message from Mercedes Garcia MD sent at 11/6/2020  4:29 PM CST -----  The Labs are stable - please let patient know.

## 2020-11-14 NOTE — PROGRESS NOTES
Transplant Hepatology  Liver Transplant Recipient Follow-up    Transplant Date: 2013  UNOS Native Liver Dx: Alcoholic Cirrhosis    Domenico is here for follow up of his liver transplant.    ORGAN: LIVER  Whole or Partial: whole liver  Donor Type:  - brain death  CDC High Risk: no  Donor CMV Status: positive  Donor HCV Status: negative  Donor HBcAb: negative  Biliary Anastomosis: end to end  Arterial Anatomy: standard  IVC reconstruction: end to end ivc  Portal vein status: patent    He has had the following complications since transplant: renal insufficiency.     Subjective:     Interval History: Domenico is now 7 years post liver transplant. Currently, he is doing well. He feels well overall. When I last saw him 3 years ago he had fractured his ankle but did not need surgery. He has gained weight.      Dermatology: had precancerous lesion removed from his face; last visit annually, no further lesions of concern    Colonoscopy in 2016 and had 3 small polyps removed. A repeat was recommended in 2019. When checked his GI said no colonoscopy due- will recheck    He is not drinking alcohol.    Allograft function 20: Tbil 0.9, ALT 31, AST 26, ALKP 87,  Creat 1.44  Immunosuppression: PG level 3.0. He continues on cellcept 500 mg bid. I have been hesitant to lower it because he intermittently has elevated liver tests.    His creatinine is fluctuating but now is low at 1.44. He is seeing a local nephrologist.    Review of Systems   Constitutional: Negative.    HENT: Negative.    Eyes: Negative.    Respiratory: Negative.    Cardiovascular: Negative.    Gastrointestinal: Negative.    Genitourinary: Negative.    Musculoskeletal: Negative.    Skin: Negative.    Neurological: Negative.    Psychiatric/Behavioral: Negative.        Objective:     Vitals:    20 1234   BP: (!) 144/86   Pulse: 67   Resp: 18   Temp: 98 °F (36.7 °C)     Physical Exam  Vitals signs reviewed.   Constitutional:        Appearance: He is well-developed.   HENT:      Head: Normocephalic and atraumatic.   Eyes:      General: No scleral icterus.     Conjunctiva/sclera: Conjunctivae normal.      Pupils: Pupils are equal, round, and reactive to light.   Neck:      Musculoskeletal: Normal range of motion and neck supple.      Thyroid: No thyromegaly.   Cardiovascular:      Rate and Rhythm: Normal rate and regular rhythm.      Heart sounds: Normal heart sounds.   Pulmonary:      Effort: Pulmonary effort is normal.      Breath sounds: Normal breath sounds. No rales.   Abdominal:      General: Bowel sounds are normal. There is no distension.      Palpations: Abdomen is soft. There is no mass.      Tenderness: There is no abdominal tenderness.   Musculoskeletal: Normal range of motion.   Skin:     General: Skin is warm and dry.      Findings: No rash.   Neurological:      Mental Status: He is alert and oriented to person, place, and time.       Lab Results   Component Value Date    BILITOT 0.5 06/15/2015    AST 38 01/25/2016    ALT 37 01/25/2016    ALKPHOS 104 06/15/2015    CREATININE 1.9 01/25/2016    ALBUMIN 4.1 01/25/2016     Lab Results   Component Value Date    WBC 5.2 01/25/2016    HGB 16.2 01/25/2016    HCT 47 01/25/2016    HCT 26 (L) 06/05/2013     (L) 06/15/2015     Lab Results   Component Value Date    TACROLIMUS 6.7 06/15/2015       Assessment/Plan:     1. Liver transplant 6/5/13 2/2 ETOH cirrhosis    2. Prophylactic immunotherapy    3. Complication of transplanted liver, unspecified complication    Current recommendations:  1. Complication of liver transplant: CRI, ongoing: continue to minimize prograf with a target of 3-4. Previously have considered changing to rapamune, but this was deferred since his liver enzymes have been elevated on several occasions. Continue cellcept 500 mg bid  3. HTN, ongoing: continue antihypertensives  5. S/p liver transplant and control of IS: continue current dose of pg and cellcept as  above  6. Health maintenance: the patient should continue to see a dermatologist annually to screen for skin cancer. Should he develop more concerning lesions, will lower cellcept to 250 mg bid He should repeat a colonoscopy per local GI-  He will check when this is next due.  7. R/O osteoporosis. Repeat bone density now -requisition given.  8. Alcohol abuse in remission: check peth test periodically    Return one year..    Mercedes Garcia MD           [facility] Patient Status  Functional Status: 100% - Normal, no complaints, no evidence of disease  Physical Capacity: No Limitations    . .

## 2020-11-16 ENCOUNTER — OFFICE VISIT (OUTPATIENT)
Dept: TRANSPLANT | Facility: CLINIC | Age: 55
End: 2020-11-16
Payer: MEDICARE

## 2020-11-16 VITALS
HEART RATE: 67 BPM | RESPIRATION RATE: 18 BRPM | SYSTOLIC BLOOD PRESSURE: 144 MMHG | BODY MASS INDEX: 33.36 KG/M2 | TEMPERATURE: 98 F | WEIGHT: 251.75 LBS | OXYGEN SATURATION: 96 % | HEIGHT: 73 IN | DIASTOLIC BLOOD PRESSURE: 86 MMHG

## 2020-11-16 DIAGNOSIS — Z94.4 S/P LIVER TRANSPLANT: Primary | Chronic | ICD-10-CM

## 2020-11-16 DIAGNOSIS — N18.9 CHRONIC RENAL IMPAIRMENT, UNSPECIFIED CKD STAGE: ICD-10-CM

## 2020-11-16 DIAGNOSIS — Z79.818 LONG TERM (CURRENT) USE OF OTHER AGENTS AFFECTING ESTROGEN RECEPTORS AND ESTROGEN LEVELS: ICD-10-CM

## 2020-11-16 DIAGNOSIS — Z94.4 STATUS POST LIVER TRANSPLANT: ICD-10-CM

## 2020-11-16 DIAGNOSIS — Z29.89 PROPHYLACTIC IMMUNOTHERAPY: ICD-10-CM

## 2020-11-16 DIAGNOSIS — I15.8 OTHER SECONDARY HYPERTENSION: Chronic | ICD-10-CM

## 2020-11-16 DIAGNOSIS — T86.40 COMPLICATION OF TRANSPLANTED LIVER, UNSPECIFIED COMPLICATION: ICD-10-CM

## 2020-11-16 PROCEDURE — 99999 PR PBB SHADOW E&M-EST. PATIENT-LVL V: CPT | Mod: PBBFAC,,, | Performed by: INTERNAL MEDICINE

## 2020-11-16 PROCEDURE — 99215 OFFICE O/P EST HI 40 MIN: CPT | Mod: PBBFAC,PN | Performed by: INTERNAL MEDICINE

## 2020-11-16 PROCEDURE — 99204 OFFICE O/P NEW MOD 45 MIN: CPT | Mod: S$PBB,,, | Performed by: INTERNAL MEDICINE

## 2020-11-16 PROCEDURE — 99999 PR PBB SHADOW E&M-EST. PATIENT-LVL V: ICD-10-PCS | Mod: PBBFAC,,, | Performed by: INTERNAL MEDICINE

## 2020-11-16 PROCEDURE — 99204 PR OFFICE/OUTPT VISIT, NEW, LEVL IV, 45-59 MIN: ICD-10-PCS | Mod: S$PBB,,, | Performed by: INTERNAL MEDICINE

## 2020-11-16 RX ORDER — MYCOPHENOLATE MOFETIL 250 MG/1
250 CAPSULE ORAL 2 TIMES DAILY
Qty: 60 CAPSULE | Refills: 11 | OUTPATIENT
Start: 2020-11-16

## 2020-11-16 NOTE — PATIENT INSTRUCTIONS
1. Continue prograf and cellcept at current doses  2. Labs per protocol  3. Dermatology annually  4. Check on when next colonoscopy is needed  5. Bone density now  Return 1 year

## 2020-11-16 NOTE — LETTER
November 16, 2020        Ron Saeed III  [address]  [facility] [address]  Phone: [phone]  Fax: [phone]             [facility] - Liver Transplant  [address] [address]  Phone: [phone]  Fax: [phone]   Patient: Domenico Trevino   MR Number: [id number]   YOB: 1965   Date of Visit: 11/16/2020       Dear Dr. Ron Saeed III    Thank you for referring Domenico Trevino to me for evaluation. Attached you will find relevant portions of my assessment and plan of care.    If you have questions, please do not hesitate to call me. I look forward to following Domenico Trevino along with you.    Sincerely,    Mercedes Garcia MD    Enclosure    If you would like to receive this communication electronically, please contact [email or web address] or [phone] to request [facility] [employer] access.    [facility] [employer] is a tool which provides read-only access to select patient information with whom you have a relationship. Its easy to use and provides real time access to review your patients record including encounter summaries, notes, results, and demographic information.    If you feel you have received this communication in error or would no longer like to receive these types of communications, please e-mail [email or web address]

## 2021-01-08 ENCOUNTER — PATIENT MESSAGE (OUTPATIENT)
Dept: TRANSPLANT | Facility: CLINIC | Age: 56
End: 2021-01-08

## 2021-01-27 LAB
EXT ALBUMIN: 4.5
EXT ALKALINE PHOSPHATASE: 82
EXT ALT: 27
EXT AST: 22
EXT BASOPHIL%: 1.1
EXT BILIRUBIN TOTAL: 1
EXT BUN: 22
EXT CALCIUM: 9.6
EXT CHLORIDE: 104
EXT CO2: 21
EXT CREATININE: 1.53 MG/DL
EXT EOSINOPHIL%: 2
EXT GFR MDRD NON AF AMER: 48
EXT GGT: 57
EXT GLUCOSE: 86
EXT HEMATOCRIT: 52.2
EXT HEMOGLOBIN: 18.3
EXT LYMPH%: 22.8
EXT MONOCYTES%: 7.9
EXT PLATELETS: 174
EXT POTASSIUM: 4.8
EXT PROTEIN TOTAL: 7.8
EXT SEGS%: 66.2
EXT SODIUM: 136 MMOL/L
EXT TACROLIMUS LVL: 4.7
EXT WBC: 7.2

## 2021-01-28 DIAGNOSIS — Z94.4 STATUS POST LIVER TRANSPLANT: ICD-10-CM

## 2021-01-28 RX ORDER — TACROLIMUS 1 MG/1
2 CAPSULE ORAL EVERY 12 HOURS
Qty: 120 CAPSULE | Refills: 11 | Status: SHIPPED | OUTPATIENT
Start: 2021-01-28 | End: 2021-11-29 | Stop reason: SDUPTHER

## 2021-03-05 LAB
EXT ALBUMIN: 4.4
EXT ALKALINE PHOSPHATASE: 75
EXT ALT: 35
EXT AST: 39
EXT BASOPHIL%: 1
EXT BILIRUBIN TOTAL: 0.9
EXT BUN: 21
EXT CALCIUM: 9.7
EXT CHLORIDE: 104
EXT CO2: 23
EXT CREATININE: 1.5 MG/DL
EXT EOSINOPHIL%: 1
EXT GFR MDRD NON AF AMER: 49
EXT GLUCOSE: 84
EXT HEMATOCRIT: 51
EXT HEMOGLOBIN: 17.8
EXT LYMPH%: 40
EXT MONOCYTES%: 8
EXT PLATELETS: 176
EXT POTASSIUM: 4.7
EXT PROTEIN TOTAL: 7.7
EXT SEGS%: ABNORMAL
EXT SODIUM: 137 MMOL/L
EXT TACROLIMUS LVL: 3
EXT WBC: 6.8

## 2021-03-12 ENCOUNTER — TELEPHONE (OUTPATIENT)
Dept: TRANSPLANT | Facility: CLINIC | Age: 56
End: 2021-03-12

## 2021-04-30 LAB
EXT ALBUMIN: 4.5
EXT ALKALINE PHOSPHATASE: 83
EXT ALT: 27
EXT AST: 21
EXT BASOPHIL%: 1
EXT BILIRUBIN TOTAL: 1.1
EXT BUN: 23
EXT CALCIUM: 9.4
EXT CHLORIDE: 103
EXT CO2: 23
EXT CREATININE: 1.49 MG/DL (ref 0.72–1.25)
EXT EOSINOPHIL%: 2
EXT GFR MDRD NON AF AMER: 59
EXT GGT: 73
EXT HEMATOCRIT: 50.7 (ref 40–50)
EXT HEMOGLOBIN: 17.9 (ref 13.7–17)
EXT LYMPH%: 23.2
EXT MONOCYTES%: 9.4
EXT PLATELETS: 196
EXT POTASSIUM: 5
EXT PROTEIN TOTAL: 7.7
EXT SEGS%: 64.4
EXT SODIUM: 137 MMOL/L
EXT TACROLIMUS LVL: 4.4
EXT WBC: 7.3

## 2021-05-03 ENCOUNTER — TELEPHONE (OUTPATIENT)
Dept: TRANSPLANT | Facility: CLINIC | Age: 56
End: 2021-05-03

## 2021-07-23 ENCOUNTER — TELEPHONE (OUTPATIENT)
Dept: TRANSPLANT | Facility: CLINIC | Age: 56
End: 2021-07-23

## 2021-07-23 LAB
BANDS - MAN (DIFF): 5
EXT ALBUMIN: 4.3
EXT ALKALINE PHOSPHATASE: 62
EXT ALT: 34
EXT AST: 22
EXT BASOPHIL%: 1
EXT BILIRUBIN TOTAL: 0.8
EXT BUN: 15
EXT CALCIUM: 9.3
EXT CHLORIDE: 105
EXT CO2: 26
EXT CREATININE: 1.42 MG/DL
EXT EOSINOPHIL%: 8
EXT GFR MDRD NON AF AMER: 52
EXT GLUCOSE: 81
EXT HEMATOCRIT: 48.8
EXT HEMOGLOBIN: 17.1
EXT LYMPH%: 30
EXT MONOCYTES%: 13
EXT PLATELETS: 180
EXT POTASSIUM: 4.8
EXT PROTEIN TOTAL: 7.2
EXT SEGS%: 43
EXT SODIUM: 138 MMOL/L
EXT TACROLIMUS LVL: 3.1
EXT WBC: 6

## 2021-11-02 LAB
EXT ALBUMIN: 4.5
EXT ALKALINE PHOSPHATASE: 83
EXT ALT: 19
EXT AST: 22
EXT BASOPHIL%: 0.9
EXT BILIRUBIN TOTAL: 0.7
EXT BUN: 29
EXT CALCIUM: 9.7
EXT CHLORIDE: 103
EXT CO2: 23
EXT CREATININE: 1.6 MG/DL
EXT EOSINOPHIL%: 1.9
EXT GFR MDRD NON AF AMER: 45
EXT GLUCOSE: 79
EXT HEMATOCRIT: 44.3
EXT HEMOGLOBIN: 15.7
EXT LYMPH%: 29.9
EXT MONOCYTES%: 8
EXT PLATELETS: 194
EXT POTASSIUM: 5.5
EXT PROTEIN TOTAL: 7.6
EXT SEGS%: 59.3
EXT SODIUM: 136 MMOL/L
EXT TACROLIMUS LVL: 3.3
EXT WBC: 4

## 2021-11-04 ENCOUNTER — TELEPHONE (OUTPATIENT)
Dept: TRANSPLANT | Facility: CLINIC | Age: 56
End: 2021-11-04
Payer: MEDICARE

## 2021-11-04 NOTE — TELEPHONE ENCOUNTER
Sara Trevino,    Dr Henry reviewed your labs and they are stable. No medication changes. Next labs due 9/14/20. Make sure to stay hydrated this summer.    Thanks,  KIMBERLEY Rodriguez     show

## 2021-11-05 ENCOUNTER — TELEPHONE (OUTPATIENT)
Dept: TRANSPLANT | Facility: CLINIC | Age: 56
End: 2021-11-05
Payer: MEDICARE

## 2021-11-08 ENCOUNTER — OFFICE VISIT (OUTPATIENT)
Dept: TRANSPLANT | Facility: CLINIC | Age: 56
End: 2021-11-08
Payer: MEDICARE

## 2021-11-08 ENCOUNTER — TELEPHONE (OUTPATIENT)
Dept: TRANSPLANT | Facility: CLINIC | Age: 56
End: 2021-11-08
Payer: MEDICARE

## 2021-11-08 VITALS
HEIGHT: 73 IN | DIASTOLIC BLOOD PRESSURE: 62 MMHG | SYSTOLIC BLOOD PRESSURE: 124 MMHG | HEART RATE: 76 BPM | WEIGHT: 210.19 LBS | OXYGEN SATURATION: 98 % | TEMPERATURE: 98 F | BODY MASS INDEX: 27.86 KG/M2 | RESPIRATION RATE: 18 BRPM

## 2021-11-08 DIAGNOSIS — Z94.4 S/P LIVER TRANSPLANT: Primary | Chronic | ICD-10-CM

## 2021-11-08 DIAGNOSIS — Z29.89 PROPHYLACTIC IMMUNOTHERAPY: ICD-10-CM

## 2021-11-08 DIAGNOSIS — N18.9 CHRONIC RENAL IMPAIRMENT, UNSPECIFIED CKD STAGE: ICD-10-CM

## 2021-11-08 PROCEDURE — 99999 PR PBB SHADOW E&M-EST. PATIENT-LVL V: ICD-10-PCS | Mod: PBBFAC,,, | Performed by: INTERNAL MEDICINE

## 2021-11-08 PROCEDURE — 99214 PR OFFICE/OUTPT VISIT, EST, LEVL IV, 30-39 MIN: ICD-10-PCS | Mod: S$PBB,,, | Performed by: INTERNAL MEDICINE

## 2021-11-08 PROCEDURE — 99214 OFFICE O/P EST MOD 30 MIN: CPT | Mod: S$PBB,,, | Performed by: INTERNAL MEDICINE

## 2021-11-08 PROCEDURE — 99999 PR PBB SHADOW E&M-EST. PATIENT-LVL V: CPT | Mod: PBBFAC,,, | Performed by: INTERNAL MEDICINE

## 2021-11-08 PROCEDURE — 99215 OFFICE O/P EST HI 40 MIN: CPT | Mod: PBBFAC,PN | Performed by: INTERNAL MEDICINE

## 2021-11-08 RX ORDER — ACETYLCYSTEINE 600 MG
CAPSULE ORAL DAILY
COMMUNITY

## 2021-11-08 RX ORDER — TRIAMTERENE/HYDROCHLOROTHIAZID 37.5-25 MG
1 TABLET ORAL
COMMUNITY
End: 2021-11-08

## 2021-11-08 RX ORDER — BUSPIRONE HYDROCHLORIDE 15 MG/1
TABLET ORAL
COMMUNITY
End: 2022-02-20

## 2021-11-08 RX ORDER — TRAZODONE HYDROCHLORIDE 150 MG/1
150 TABLET ORAL NIGHTLY
COMMUNITY
Start: 2021-10-30 | End: 2022-11-02 | Stop reason: HOSPADM

## 2021-11-08 RX ORDER — METOPROLOL SUCCINATE 50 MG/1
50 TABLET, EXTENDED RELEASE ORAL 2 TIMES DAILY
COMMUNITY
Start: 2021-09-08 | End: 2022-02-21 | Stop reason: CLARIF

## 2021-11-08 RX ORDER — TESTOSTERONE CYPIONATE 200 MG/ML
INJECTION, SOLUTION INTRAMUSCULAR
COMMUNITY
Start: 2021-09-01

## 2021-11-08 RX ORDER — LISINOPRIL 20 MG/1
20 TABLET ORAL DAILY
COMMUNITY
Start: 2021-10-30 | End: 2021-11-08

## 2021-11-08 RX ORDER — ACETAMINOPHEN 500 MG
TABLET ORAL DAILY
COMMUNITY
Start: 2018-08-01

## 2021-11-08 RX ORDER — MELATONIN 10 MG
CAPSULE ORAL NIGHTLY PRN
COMMUNITY

## 2021-11-29 DIAGNOSIS — Z94.4 STATUS POST LIVER TRANSPLANT: ICD-10-CM

## 2021-11-29 RX ORDER — MYCOPHENOLATE MOFETIL 250 MG/1
500 CAPSULE ORAL 2 TIMES DAILY
Qty: 240 CAPSULE | Refills: 11 | Status: SHIPPED | OUTPATIENT
Start: 2021-11-29 | End: 2022-12-27

## 2021-11-29 RX ORDER — TACROLIMUS 1 MG/1
2 CAPSULE ORAL EVERY 12 HOURS
Qty: 120 CAPSULE | Refills: 11 | Status: SHIPPED | OUTPATIENT
Start: 2021-11-29 | End: 2022-12-22 | Stop reason: SDUPTHER

## 2022-02-02 ENCOUNTER — PATIENT MESSAGE (OUTPATIENT)
Dept: TRANSPLANT | Facility: CLINIC | Age: 57
End: 2022-02-02
Payer: MEDICARE

## 2022-02-08 ENCOUNTER — OFFICE VISIT (OUTPATIENT)
Dept: TRANSPLANT | Facility: CLINIC | Age: 57
End: 2022-02-08
Payer: MEDICARE

## 2022-02-08 VITALS
WEIGHT: 202.63 LBS | DIASTOLIC BLOOD PRESSURE: 81 MMHG | OXYGEN SATURATION: 96 % | SYSTOLIC BLOOD PRESSURE: 162 MMHG | HEART RATE: 88 BPM | HEIGHT: 73 IN | BODY MASS INDEX: 26.85 KG/M2 | TEMPERATURE: 97 F | RESPIRATION RATE: 18 BRPM

## 2022-02-08 DIAGNOSIS — Z51.81 ENCOUNTER FOR THERAPEUTIC DRUG MONITORING: ICD-10-CM

## 2022-02-08 DIAGNOSIS — K40.21 BILATERAL RECURRENT INGUINAL HERNIA WITHOUT OBSTRUCTION OR GANGRENE: Primary | ICD-10-CM

## 2022-02-08 DIAGNOSIS — Z94.4 STATUS POST LIVER TRANSPLANT: ICD-10-CM

## 2022-02-08 PROCEDURE — 99214 PR OFFICE/OUTPT VISIT, EST, LEVL IV, 30-39 MIN: ICD-10-PCS | Mod: 24,S$PBB,, | Performed by: SURGERY

## 2022-02-08 PROCEDURE — 99214 OFFICE O/P EST MOD 30 MIN: CPT | Mod: PBBFAC

## 2022-02-08 PROCEDURE — 99214 OFFICE O/P EST MOD 30 MIN: CPT | Mod: 24,S$PBB,, | Performed by: SURGERY

## 2022-02-08 PROCEDURE — 99999 PR PBB SHADOW E&M-EST. PATIENT-LVL IV: CPT | Mod: PBBFAC,,,

## 2022-02-08 PROCEDURE — 99999 PR PBB SHADOW E&M-EST. PATIENT-LVL IV: ICD-10-PCS | Mod: PBBFAC,,,

## 2022-02-08 RX ORDER — ASPIRIN 81 MG/1
81 TABLET ORAL NIGHTLY
COMMUNITY

## 2022-02-08 RX ORDER — ASCORBIC ACID 125 MG
TABLET,CHEWABLE ORAL DAILY
COMMUNITY

## 2022-02-08 NOTE — PROGRESS NOTES
Transplant Surgery  Liver Transplant Recipient Follow-up    Original Referring Physician: Roxanna Wilson III  Current Corresponding Physician: Ron Saeed III    Chief Complaint: Domenico is here for follow up of his liver transplant performed 6/5/2013 for the primary diagnosis (UNOS) of Alcoholic Cirrhosis    Bilateral symptomatic inguinal hernias - would like both repaired at same time - laparoscopic, robotic or preperitoneal if possible.    ORGAN: LIVER  Whole or Partial: whole liver  Donor Type: donation after brain death  PHS Increased Risk: no  Donor CMV Status: Positive  Donor HCV Status: Negative  Donor HBcAb: Negative  Donor HBV BONNY: Not Done  Donor HCV BONNY:     Biliary Anastomosis: end to end  Arterial Anatomy: standard  IVC reconstruction: end to end ivc  Portal vein status: patent    Subjective:     History of Present Illness: He has had the following complications since transplant: none.  The noted complications are well controlled.    Interval History: Currently, he is doing well.  Current complaints include none.  Domenico is here for management of his immunosuppression medication.    External provider notes reviewed: No    Review of Systems  Objective:     Physical Exam  Abdominal:          Comments: Bilateral palpable inguinal hernias R>L    No evidence of incarceration       Lab Results   Component Value Date    BILITOT 0.5 06/15/2015    AST 38 01/25/2016    ALT 37 01/25/2016    ALKPHOS 104 06/15/2015    CREATININE 1.9 01/25/2016    ALBUMIN 4.1 01/25/2016     Lab Results   Component Value Date    WBC 5.2 01/25/2016    HGB 16.2 01/25/2016    HCT 47 01/25/2016    HCT 26 (L) 06/05/2013     (L) 06/15/2015     Lab Results   Component Value Date    TACROLIMUS 6.7 06/15/2015       Diagnostics:  The following labs have been reviewed: CBC      Assessment/Plan:          · S/P liver transplant.  · Chronic immunosuppressive medications for rejection prophylaxis at  target.  Plan: no adjustment needed.  · Continue monitoring symptoms, labs and drug levels for drug-related toxicity and side effects.  · Incision: staples in place; wound clean, dry, and intact  · Femoral arterial line site: no complications evident     Patient has bilateral inguinal hernias - will have Dr. Harrison from minimally invasive surgery evaluate and likely operate on.    Additional testing to be completed according to Written Order Guidelines for Post-Liver and Combined Liver/Kidney Transplant Follow-up (LI-09)    Interpretation of tests and discussion of patient management involves all members of the multidisciplinary transplant team  Patient advised that it is recommended that all transplanted patients, and their close contacts and household members receive Covid vaccination.  Lucian Payne MD       [facility] Patient Status  Functional Status: 100% - Normal, no complaints, no evidence of disease  Physical Capacity: No Limitations

## 2022-02-14 ENCOUNTER — TELEPHONE (OUTPATIENT)
Dept: SURGERY | Facility: CLINIC | Age: 57
End: 2022-02-14
Payer: MEDICARE

## 2022-02-14 NOTE — TELEPHONE ENCOUNTER
----- Message from Ольга Espinal sent at 2/14/2022  1:20 PM CST -----  Regarding: pts wife  Pts wife is calling to speak with the nurse to see if they will be having testing and labs done the day of the pts appt pt is traveling three hours away. They are trying to get everything planned before. Can you please call pts wife WILL Trevino  at [phone].      Called and left a phone message for the Patient's Wife - Will.  Explained that Dr. Scanlon will examine her  and may not need further testing because Dr. Payne was able to detect bilateral inguinal hernias.  Explained that we may need lab and an ekg if surgery is scheduled.  We would be able get those tests here at [facility] or if he has had lab work or an ekg closer to home recently, we could obtain those records.  Left the Office phone number for any questions.

## 2022-02-18 ENCOUNTER — TELEPHONE (OUTPATIENT)
Dept: SURGERY | Facility: CLINIC | Age: 57
End: 2022-02-18

## 2022-02-18 ENCOUNTER — OFFICE VISIT (OUTPATIENT)
Dept: SURGERY | Facility: CLINIC | Age: 57
End: 2022-02-18
Payer: MEDICARE

## 2022-02-18 VITALS
DIASTOLIC BLOOD PRESSURE: 92 MMHG | BODY MASS INDEX: 26.8 KG/M2 | HEART RATE: 75 BPM | WEIGHT: 202.19 LBS | SYSTOLIC BLOOD PRESSURE: 153 MMHG | HEIGHT: 73 IN

## 2022-02-18 DIAGNOSIS — Z94.4 S/P LIVER TRANSPLANT: Chronic | ICD-10-CM

## 2022-02-18 DIAGNOSIS — K40.21 BILATERAL RECURRENT INGUINAL HERNIA WITHOUT OBSTRUCTION OR GANGRENE: ICD-10-CM

## 2022-02-18 DIAGNOSIS — Z01.818 PREOP TESTING: Primary | ICD-10-CM

## 2022-02-18 DIAGNOSIS — I15.8 OTHER SECONDARY HYPERTENSION: Chronic | ICD-10-CM

## 2022-02-18 PROCEDURE — 99999 PR PBB SHADOW E&M-EST. PATIENT-LVL IV: CPT | Mod: PBBFAC,,, | Performed by: SURGERY

## 2022-02-18 PROCEDURE — 99215 PR OFFICE/OUTPT VISIT, EST, LEVL V, 40-54 MIN: ICD-10-PCS | Mod: S$PBB,,, | Performed by: SURGERY

## 2022-02-18 PROCEDURE — 99214 OFFICE O/P EST MOD 30 MIN: CPT | Mod: PBBFAC | Performed by: SURGERY

## 2022-02-18 PROCEDURE — 99215 OFFICE O/P EST HI 40 MIN: CPT | Mod: S$PBB,,, | Performed by: SURGERY

## 2022-02-18 PROCEDURE — 99999 PR PBB SHADOW E&M-EST. PATIENT-LVL IV: ICD-10-PCS | Mod: PBBFAC,,, | Performed by: SURGERY

## 2022-02-18 NOTE — TELEPHONE ENCOUNTER
----- Message from Jackelyn Ramírez sent at 2/18/2022  4:23 PM CST -----  Contact: Patient  Patient wife requesting call back in regards to having labs done over the weekend. Patient wife also would like to know if the labs are needed for the procedure.      Patient@ [phone]

## 2022-02-18 NOTE — TELEPHONE ENCOUNTER
----- Message from Mercedes Garcia MD sent at 2/18/2022  3:58 PM CST -----  He Is cleared for surgery. Have inpt hepatology help with IS maximiliano-operatively  ----- Message -----  From: Loreto Burris, RN  Sent: 2/18/2022   3:43 PM CST  To: Mercedes Garcia MD      ----- Message -----  From: Sierra Goddard RN  Sent: 2/18/2022   3:41 PM CST  To: [facility] Post-Liver Transplant Clinical      ----- Message -----  From: Fariba Gayle RN  Sent: 2/18/2022   3:16 PM CST  To: [facility] Staff    Hi-    We are trying to move forward with a lap bilateral inguinal hernia repair on this Tuesday 2/22.  Prior to proceeding, Dr. Scanlon wanted to make sure pt was ok from a transplant standpoint.    Please advise.    Thanks,  Fariba

## 2022-02-18 NOTE — PROGRESS NOTES
History & Physical    SUBJECTIVE:     History of Present Illness:  Patient is a 56 y.o. male presents with Bilateral inguinal hernias.  Pt with history of liver transplant about 8 years ago.  He noticed hernias at this time but had no symptoms.  He has noticed recently increase in size and symptoms.  Worse with right hernia.  No obstructive symptoms.  No nausea/vomiting.      Review of patient's allergies indicates:  No Known Allergies    Current Outpatient Medications   Medication Sig Dispense Refill    aspirin (ECOTRIN) 81 MG EC tablet Take 81 mg by mouth once daily.      atorvastatin (LIPITOR) 10 MG tablet Take 10 mg by mouth once daily.      cholecalciferol, vitamin D3, 125 mcg (5,000 unit) Tab       diphenhydrAMINE-acetaminophen (TYLENOL PM)  mg Tab Take 2 tablets by mouth nightly as needed.      magnesium oxide (MAG-OX) 400 mg tablet Take 1 tablet (400 mg total) by mouth 2 (two) times daily. 60 tablet 5    melatonin 10 mg Cap Take by mouth.      metoprolol succinate (TOPROL-XL) 50 MG 24 hr tablet Take 50 mg by mouth 2 (two) times daily.      metoprolol tartrate (LOPRESSOR) 25 MG tablet Take 1 tablet (25 mg total) by mouth 2 (two) times daily. 60 tablet 11    multivitamin (THERAGRAN) tablet Take 1 tablet by mouth once daily. 30 tablet 5    mycophenolate (CELLCEPT) 250 mg Cap Take 2 capsules (500 mg total) by mouth 2 (two) times daily. 240 capsule 11    omega 3-dha-epa-fish-turmeric (OMEGA MONOPURE CURCUMIN EC) 417 mg-120 mg- 276 mg-600 mg Cap Take by mouth.      pregabalin (LYRICA) 225 MG Cap Take 300 mg by mouth 2 (two) times daily.       tacrolimus (PROGRAF) 1 MG Cap Take 2 capsules (2 mg total) by mouth every 12 (twelve) hours. 120 capsule 11    testosterone cypionate (DEPOTESTOTERONE CYPIONATE) 200 mg/mL injection SMARTSI Milliliter(s) IM Every 3 Weeks      traZODone (DESYREL) 150 MG tablet Take 150 mg by mouth once daily.      trazodone (DESYREL) 50 MG tablet Take 100 mg by mouth  every evening.       turmeric (CURCUMIN MISC) by Misc.(Non-Drug; Combo Route) route. 1650 mg daily      vitamin K2 100 mcg Cap Take by mouth once daily.      zinc 50 mg Tab       busPIRone (BUSPAR) 15 MG tablet buspirone 15 mg tablet   Take 1 tablet every 8 hours by oral route.       No current facility-administered medications for this visit.       Past Medical History:   Diagnosis Date    Alcohol dependence in remission     Alcoholic cirrhosis of liver with ascites     Chronic kidney disease     Hepatic encephalopathy 2013    Hypertension     Kidney stone     S/P liver transplant     Urinary tract infection      Past Surgical History:   Procedure Laterality Date    ADENOIDECTOMY W/ MYRINGOTOMY AND TUBES      TONSILLECTOMY      URETERAL STENT PLACEMENT       Family History   Problem Relation Age of Onset    Autism Daughter      Social History     Tobacco Use    Smoking status: Former Smoker     Packs/day: 1.00     Years: 29.00     Pack years: 29.00     Quit date: 2013     Years since quittin.9    Smokeless tobacco: Former User   Substance Use Topics    Alcohol use: No     Alcohol/week: 0.0 standard drinks    Drug use: No        Review of Systems:  Review of Systems   Constitutional: Negative for activity change, appetite change, chills, diaphoresis, fatigue and fever.   HENT: Negative for congestion, sinus pressure, sneezing and sore throat.    Eyes: Negative for pain, discharge, redness, itching and visual disturbance.   Respiratory: Negative for apnea, cough, choking, chest tightness and shortness of breath.    Cardiovascular: Negative for chest pain and palpitations.   Gastrointestinal: Positive for abdominal pain (Groin). Negative for abdominal distention, constipation, diarrhea, nausea and vomiting.   Musculoskeletal: Negative for arthralgias, back pain and gait problem.   Skin: Negative for color change, pallor and rash.   Neurological: Negative for dizziness, facial  "asymmetry, light-headedness and headaches.   Psychiatric/Behavioral: Negative for agitation, behavioral problems and confusion.       OBJECTIVE:     Vital Signs (Most Recent)  Pulse: 75 (02/18/22 0848)  BP: (!) 153/92 (02/18/22 0848)  6' 1" (1.854 m)  91.7 kg (202 lb 2.6 oz)     Physical Exam:  Physical Exam  Constitutional:       General: He is not in acute distress.     Appearance: Normal appearance. He is not diaphoretic.   HENT:      Head: Normocephalic and atraumatic.      Right Ear: External ear normal.      Left Ear: External ear normal.   Eyes:      General: No scleral icterus.        Right eye: No discharge.         Left eye: No discharge.   Cardiovascular:      Rate and Rhythm: Normal rate and regular rhythm.   Pulmonary:      Effort: Pulmonary effort is normal. No respiratory distress.   Abdominal:      General: There is no distension.      Palpations: Abdomen is soft. There is no mass.      Tenderness: There is no abdominal tenderness. There is no guarding or rebound.      Hernia: A hernia (bilateral inguinal) is present.   Musculoskeletal:         General: Normal range of motion.   Skin:     General: Skin is warm and dry.      Coloration: Skin is not pale.      Findings: No erythema or rash.   Neurological:      Mental Status: He is alert and oriented to person, place, and time.   Psychiatric:         Mood and Affect: Mood normal.         Behavior: Behavior normal.         Thought Content: Thought content normal.         Judgment: Judgment normal.           ASSESSMENT/PLAN:     Bilateral inguinal hernia    PLAN:Plan     To OR for lap bilateral inguinal hernia repairs with mesh  Will get Transplant clearance and medicine recs  Consent obtained    Alex Scanlon MD    "

## 2022-02-18 NOTE — TELEPHONE ENCOUNTER
----- Message from Mercedes Garcia MD sent at 2/18/2022  3:50 PM CST -----  Cleared for surgery  ----- Message -----  From: Loreto Burris, RN  Sent: 2/18/2022   3:43 PM CST  To: Mercedes Garcia MD      ----- Message -----  From: Sierra Goddard RN  Sent: 2/18/2022   3:41 PM CST  To: [facility] Post-Liver Transplant Clinical      ----- Message -----  From: Fariba Gayle RN  Sent: 2/18/2022   3:16 PM CST  To: [facility] Staff    Hi-    We are trying to move forward with a lap bilateral inguinal hernia repair on this Tuesday 2/22.  Prior to proceeding, Dr. Scanlon wanted to make sure pt was ok from a transplant standpoint.    Please advise.    Thanks,  aFriba

## 2022-02-20 ENCOUNTER — TELEPHONE (OUTPATIENT)
Dept: SURGERY | Facility: CLINIC | Age: 57
End: 2022-02-20
Payer: MEDICARE

## 2022-02-20 NOTE — TELEPHONE ENCOUNTER
Called and spoke to Patient.  Asked what time that he and his Wife will be driving in on Monday.  He stated that he can come at any time.  Explained that we need to get labs and an ekg before his surgery on 2-22-22.  Scheduled an ekg and lab appointments for 2-21 afternoon.  Post-op appointment scheduled.  Explained that we will call him tomorrow with his arrival time for surgery.  They will be staying at the [facility] on 2-21 evening.  He did not have any questions at present and is excited that he was able to move up his surgery date.

## 2022-02-21 ENCOUNTER — HOSPITAL ENCOUNTER (OUTPATIENT)
Dept: CARDIOLOGY | Facility: CLINIC | Age: 57
Discharge: HOME | End: 2022-02-21
Payer: MEDICARE

## 2022-02-21 ENCOUNTER — TELEPHONE (OUTPATIENT)
Dept: SURGERY | Facility: CLINIC | Age: 57
End: 2022-02-21
Payer: MEDICARE

## 2022-02-21 DIAGNOSIS — I15.8 OTHER SECONDARY HYPERTENSION: Chronic | ICD-10-CM

## 2022-02-21 PROCEDURE — 93010 ELECTROCARDIOGRAM REPORT: CPT | Mod: S$PBB,,, | Performed by: INTERNAL MEDICINE

## 2022-02-21 PROCEDURE — 93010 EKG 12-LEAD: ICD-10-PCS | Mod: S$PBB,,, | Performed by: INTERNAL MEDICINE

## 2022-02-21 PROCEDURE — 93005 ELECTROCARDIOGRAM TRACING: CPT | Mod: PBBFAC | Performed by: INTERNAL MEDICINE

## 2022-02-22 ENCOUNTER — ANESTHESIA EVENT (OUTPATIENT)
Dept: SURGERY | Facility: HOSPITAL | Age: 57
End: 2022-02-22
Payer: MEDICARE

## 2022-02-22 ENCOUNTER — ANESTHESIA (OUTPATIENT)
Dept: SURGERY | Facility: HOSPITAL | Age: 57
End: 2022-02-22
Payer: MEDICARE

## 2022-02-22 ENCOUNTER — HOSPITAL ENCOUNTER (OUTPATIENT)
Facility: HOSPITAL | Age: 57
Discharge: HOME | End: 2022-02-22
Attending: SURGERY | Admitting: SURGERY
Payer: MEDICARE

## 2022-02-22 VITALS
BODY MASS INDEX: 26.77 KG/M2 | HEART RATE: 110 BPM | WEIGHT: 202 LBS | TEMPERATURE: 98 F | RESPIRATION RATE: 18 BRPM | DIASTOLIC BLOOD PRESSURE: 76 MMHG | OXYGEN SATURATION: 98 % | SYSTOLIC BLOOD PRESSURE: 150 MMHG | HEIGHT: 73 IN

## 2022-02-22 DIAGNOSIS — Z94.4 S/P LIVER TRANSPLANT: Chronic | ICD-10-CM

## 2022-02-22 DIAGNOSIS — K40.21 BILATERAL RECURRENT INGUINAL HERNIA WITHOUT OBSTRUCTION OR GANGRENE: ICD-10-CM

## 2022-02-22 LAB
CTP QC/QA: YES
SARS-COV-2 AG RESP QL IA.RAPID: NEGATIVE

## 2022-02-22 PROCEDURE — 37000008 HC ANESTHESIA 1ST 15 MINUTES: Performed by: SURGERY

## 2022-02-22 PROCEDURE — 37000009 HC ANESTHESIA EA ADD 15 MINS: Performed by: SURGERY

## 2022-02-22 PROCEDURE — 49650 LAP ING HERNIA REPAIR INIT: CPT | Mod: 50,,, | Performed by: SURGERY

## 2022-02-22 PROCEDURE — C1727 CATH, BAL TIS DIS, NON-VAS: HCPCS | Performed by: SURGERY

## 2022-02-22 PROCEDURE — 63600175 PHARM REV CODE 636 W HCPCS: Performed by: SURGERY

## 2022-02-22 PROCEDURE — D9220A PRA ANESTHESIA: Mod: CRNA,,, | Performed by: NURSE ANESTHETIST, CERTIFIED REGISTERED

## 2022-02-22 PROCEDURE — 25000003 PHARM REV CODE 250

## 2022-02-22 PROCEDURE — D9220A PRA ANESTHESIA: ICD-10-PCS | Mod: ANES,,, | Performed by: ANESTHESIOLOGY

## 2022-02-22 PROCEDURE — 25000003 PHARM REV CODE 250: Performed by: STUDENT IN AN ORGANIZED HEALTH CARE EDUCATION/TRAINING PROGRAM

## 2022-02-22 PROCEDURE — D9220A PRA ANESTHESIA: Mod: ANES,,, | Performed by: ANESTHESIOLOGY

## 2022-02-22 PROCEDURE — 63600175 PHARM REV CODE 636 W HCPCS

## 2022-02-22 PROCEDURE — D9220A PRA ANESTHESIA: ICD-10-PCS | Mod: CRNA,,, | Performed by: NURSE ANESTHETIST, CERTIFIED REGISTERED

## 2022-02-22 PROCEDURE — 27201423 OPTIME MED/SURG SUP & DEVICES STERILE SUPPLY: Performed by: SURGERY

## 2022-02-22 PROCEDURE — 49650 PR LAP,INGUINAL HERNIA REPR,INITIAL: ICD-10-PCS | Mod: 50,,, | Performed by: SURGERY

## 2022-02-22 PROCEDURE — 25000003 PHARM REV CODE 250: Performed by: SURGERY

## 2022-02-22 PROCEDURE — 71000044 HC DOSC ROUTINE RECOVERY FIRST HOUR: Performed by: SURGERY

## 2022-02-22 PROCEDURE — 71000015 HC POSTOP RECOV 1ST HR: Performed by: SURGERY

## 2022-02-22 PROCEDURE — C1781 MESH (IMPLANTABLE): HCPCS | Performed by: SURGERY

## 2022-02-22 PROCEDURE — 71000016 HC POSTOP RECOV ADDL HR: Performed by: SURGERY

## 2022-02-22 PROCEDURE — 63600175 PHARM REV CODE 636 W HCPCS: Performed by: ANESTHESIOLOGY

## 2022-02-22 PROCEDURE — 36000708 HC OR TIME LEV III 1ST 15 MIN: Performed by: SURGERY

## 2022-02-22 PROCEDURE — 36000709 HC OR TIME LEV III EA ADD 15 MIN: Performed by: SURGERY

## 2022-02-22 DEVICE — MESH 3DMAX ANAT LG LEFT 4X6IN: Type: IMPLANTABLE DEVICE | Site: GROIN | Status: FUNCTIONAL

## 2022-02-22 DEVICE — MESH 3DMAX ANAT LG RIGHT 4X6IN: Type: IMPLANTABLE DEVICE | Site: GROIN | Status: FUNCTIONAL

## 2022-02-22 RX ORDER — LIDOCAINE HYDROCHLORIDE AND EPINEPHRINE 10; 10 MG/ML; UG/ML
INJECTION, SOLUTION INFILTRATION; PERINEURAL
Status: DISCONTINUED | OUTPATIENT
Start: 2022-02-22 | End: 2022-02-22 | Stop reason: HOSPADM

## 2022-02-22 RX ORDER — SODIUM CHLORIDE 9 MG/ML
INJECTION, SOLUTION INTRAVENOUS CONTINUOUS
Status: DISCONTINUED | OUTPATIENT
Start: 2022-02-22 | End: 2022-02-22 | Stop reason: HOSPADM

## 2022-02-22 RX ORDER — HYDROMORPHONE HYDROCHLORIDE 1 MG/ML
0.2 INJECTION, SOLUTION INTRAMUSCULAR; INTRAVENOUS; SUBCUTANEOUS EVERY 5 MIN PRN
Status: DISCONTINUED | OUTPATIENT
Start: 2022-02-22 | End: 2022-02-22 | Stop reason: HOSPADM

## 2022-02-22 RX ORDER — MIDAZOLAM HYDROCHLORIDE 1 MG/ML
INJECTION INTRAMUSCULAR; INTRAVENOUS
Status: DISCONTINUED | OUTPATIENT
Start: 2022-02-22 | End: 2022-02-22

## 2022-02-22 RX ORDER — CEFAZOLIN SODIUM/WATER 2 G/20 ML
2 SYRINGE (ML) INTRAVENOUS
Status: COMPLETED | OUTPATIENT
Start: 2022-02-22 | End: 2022-02-22

## 2022-02-22 RX ORDER — OXYCODONE HYDROCHLORIDE 5 MG/1
5 TABLET ORAL ONCE
Status: COMPLETED | OUTPATIENT
Start: 2022-02-22 | End: 2022-02-22

## 2022-02-22 RX ORDER — LIDOCAINE HCL/PF 100 MG/5ML
SYRINGE (ML) INTRAVENOUS
Status: DISCONTINUED | OUTPATIENT
Start: 2022-02-22 | End: 2022-02-22

## 2022-02-22 RX ORDER — BUPIVACAINE HYDROCHLORIDE 2.5 MG/ML
INJECTION, SOLUTION EPIDURAL; INFILTRATION; INTRACAUDAL
Status: DISCONTINUED | OUTPATIENT
Start: 2022-02-22 | End: 2022-02-22 | Stop reason: HOSPADM

## 2022-02-22 RX ORDER — OXYCODONE HYDROCHLORIDE 5 MG/1
5 TABLET ORAL EVERY 6 HOURS PRN
Qty: 15 TABLET | Refills: 0 | Status: SHIPPED | OUTPATIENT
Start: 2022-02-22 | End: 2022-11-02 | Stop reason: HOSPADM

## 2022-02-22 RX ORDER — SODIUM CHLORIDE 0.9 % (FLUSH) 0.9 %
3 SYRINGE (ML) INJECTION
Status: DISCONTINUED | OUTPATIENT
Start: 2022-02-22 | End: 2022-02-22 | Stop reason: HOSPADM

## 2022-02-22 RX ORDER — KETAMINE HCL IN 0.9 % NACL 50 MG/5 ML
SYRINGE (ML) INTRAVENOUS
Status: DISCONTINUED | OUTPATIENT
Start: 2022-02-22 | End: 2022-02-22

## 2022-02-22 RX ORDER — DEXAMETHASONE SODIUM PHOSPHATE 4 MG/ML
INJECTION, SOLUTION INTRA-ARTICULAR; INTRALESIONAL; INTRAMUSCULAR; INTRAVENOUS; SOFT TISSUE
Status: DISCONTINUED | OUTPATIENT
Start: 2022-02-22 | End: 2022-02-22

## 2022-02-22 RX ORDER — SODIUM CHLORIDE 9 MG/ML
INJECTION, SOLUTION INTRAVENOUS CONTINUOUS PRN
Status: DISCONTINUED | OUTPATIENT
Start: 2022-02-22 | End: 2022-02-22

## 2022-02-22 RX ORDER — EPHEDRINE SULFATE 50 MG/ML
INJECTION, SOLUTION INTRAVENOUS
Status: DISCONTINUED | OUTPATIENT
Start: 2022-02-22 | End: 2022-02-22

## 2022-02-22 RX ORDER — ONDANSETRON 2 MG/ML
INJECTION INTRAMUSCULAR; INTRAVENOUS
Status: DISCONTINUED | OUTPATIENT
Start: 2022-02-22 | End: 2022-02-22

## 2022-02-22 RX ORDER — ROCURONIUM BROMIDE 10 MG/ML
INJECTION, SOLUTION INTRAVENOUS
Status: DISCONTINUED | OUTPATIENT
Start: 2022-02-22 | End: 2022-02-22

## 2022-02-22 RX ORDER — FENTANYL CITRATE 50 UG/ML
50 INJECTION, SOLUTION INTRAMUSCULAR; INTRAVENOUS EVERY 5 MIN PRN
Status: DISCONTINUED | OUTPATIENT
Start: 2022-02-22 | End: 2022-02-22 | Stop reason: HOSPADM

## 2022-02-22 RX ORDER — ONDANSETRON 2 MG/ML
4 INJECTION INTRAMUSCULAR; INTRAVENOUS ONCE AS NEEDED
Status: COMPLETED | OUTPATIENT
Start: 2022-02-22 | End: 2022-02-22

## 2022-02-22 RX ORDER — PROPOFOL 10 MG/ML
VIAL (ML) INTRAVENOUS
Status: DISCONTINUED | OUTPATIENT
Start: 2022-02-22 | End: 2022-02-22

## 2022-02-22 RX ORDER — FENTANYL CITRATE 50 UG/ML
INJECTION, SOLUTION INTRAMUSCULAR; INTRAVENOUS
Status: DISCONTINUED | OUTPATIENT
Start: 2022-02-22 | End: 2022-02-22

## 2022-02-22 RX ADMIN — DEXAMETHASONE SODIUM PHOSPHATE 8 MG: 4 INJECTION INTRA-ARTICULAR; INTRALESIONAL; INTRAMUSCULAR; INTRAVENOUS; SOFT TISSUE at 10:02

## 2022-02-22 RX ADMIN — HYDROMORPHONE HYDROCHLORIDE 0.2 MG: 1 INJECTION, SOLUTION INTRAMUSCULAR; INTRAVENOUS; SUBCUTANEOUS at 12:02

## 2022-02-22 RX ADMIN — FENTANYL CITRATE 100 MCG: 50 INJECTION INTRAMUSCULAR; INTRAVENOUS at 10:02

## 2022-02-22 RX ADMIN — ROCURONIUM BROMIDE 20 MG: 10 INJECTION, SOLUTION INTRAVENOUS at 11:02

## 2022-02-22 RX ADMIN — ROCURONIUM BROMIDE 10 MG: 10 INJECTION, SOLUTION INTRAVENOUS at 11:02

## 2022-02-22 RX ADMIN — ROCURONIUM BROMIDE 50 MG: 10 INJECTION, SOLUTION INTRAVENOUS at 10:02

## 2022-02-22 RX ADMIN — Medication 5 MG: at 11:02

## 2022-02-22 RX ADMIN — OXYCODONE 5 MG: 5 TABLET ORAL at 12:02

## 2022-02-22 RX ADMIN — ROCURONIUM BROMIDE 10 MG: 10 INJECTION, SOLUTION INTRAVENOUS at 10:02

## 2022-02-22 RX ADMIN — ONDANSETRON HYDROCHLORIDE 4 MG: 2 INJECTION INTRAMUSCULAR; INTRAVENOUS at 11:02

## 2022-02-22 RX ADMIN — GLYCOPYRROLATE 0.2 MG: 0.2 INJECTION INTRAMUSCULAR; INTRAVENOUS at 10:02

## 2022-02-22 RX ADMIN — SODIUM CHLORIDE, SODIUM GLUCONATE, SODIUM ACETATE, POTASSIUM CHLORIDE, MAGNESIUM CHLORIDE, SODIUM PHOSPHATE, DIBASIC, AND POTASSIUM PHOSPHATE: .53; .5; .37; .037; .03; .012; .00082 INJECTION, SOLUTION INTRAVENOUS at 11:02

## 2022-02-22 RX ADMIN — LIDOCAINE HYDROCHLORIDE 100 MG: 20 INJECTION, SOLUTION INTRAVENOUS at 10:02

## 2022-02-22 RX ADMIN — FENTANYL CITRATE 100 MCG: 50 INJECTION INTRAMUSCULAR; INTRAVENOUS at 11:02

## 2022-02-22 RX ADMIN — FENTANYL CITRATE 50 MCG: 50 INJECTION INTRAMUSCULAR; INTRAVENOUS at 12:02

## 2022-02-22 RX ADMIN — EPHEDRINE SULFATE 5 MG: 50 INJECTION INTRAVENOUS at 10:02

## 2022-02-22 RX ADMIN — SODIUM CHLORIDE, SODIUM GLUCONATE, SODIUM ACETATE, POTASSIUM CHLORIDE, MAGNESIUM CHLORIDE, SODIUM PHOSPHATE, DIBASIC, AND POTASSIUM PHOSPHATE: .53; .5; .37; .037; .03; .012; .00082 INJECTION, SOLUTION INTRAVENOUS at 10:02

## 2022-02-22 RX ADMIN — MIDAZOLAM HYDROCHLORIDE 2 MG: 1 INJECTION, SOLUTION INTRAMUSCULAR; INTRAVENOUS at 09:02

## 2022-02-22 RX ADMIN — PROPOFOL 250 MG: 10 INJECTION, EMULSION INTRAVENOUS at 10:02

## 2022-02-22 RX ADMIN — Medication 2 G: at 10:02

## 2022-02-22 RX ADMIN — SODIUM CHLORIDE: 0.9 INJECTION, SOLUTION INTRAVENOUS at 09:02

## 2022-02-22 RX ADMIN — ROCURONIUM BROMIDE 20 MG: 10 INJECTION, SOLUTION INTRAVENOUS at 10:02

## 2022-02-22 RX ADMIN — SUGAMMADEX 200 MG: 100 INJECTION, SOLUTION INTRAVENOUS at 11:02

## 2022-02-22 RX ADMIN — Medication 20 MG: at 10:02

## 2022-02-22 RX ADMIN — ONDANSETRON 4 MG: 2 INJECTION INTRAMUSCULAR; INTRAVENOUS at 12:02

## 2022-02-22 NOTE — TRANSFER OF CARE
"Anesthesia Transfer of Care Note    Patient: Domenico Trevino    Procedure(s) Performed: Procedure(s) (LRB):  REPAIR, HERNIA, INGUINAL, BILATERAL, LAPAROSCOPIC w/ mesh (Bilateral)    Patient location: [facility]    Anesthesia Type: general    Transport from OR: Transported from OR on 6-10 L/min O2 by face mask with adequate spontaneous ventilation    Post pain: adequate analgesia    Post assessment: no apparent anesthetic complications    Post vital signs: stable    Level of consciousness: awake and alert    Nausea/Vomiting: no nausea/vomiting    Complications: none    Transfer of care protocol was followed      Last vitals:   Visit Vitals  BP (!) 165/95 (BP Location: Right arm, Patient Position: Lying)   Pulse 65   Temp 36.6 °C (97.9 °F) (Oral)   Resp 18   Ht 6' 1" (1.854 m)   Wt 91.6 kg (202 lb)   SpO2 100%   BMI 26.65 kg/m²     "

## 2022-02-22 NOTE — ANESTHESIA POSTPROCEDURE EVALUATION
Anesthesia Post Evaluation    Patient: Domenico Trevino    Procedure(s) Performed: Procedure(s) (LRB):  REPAIR, HERNIA, INGUINAL, BILATERAL, LAPAROSCOPIC w/ mesh (Bilateral)    Final Anesthesia Type: general      Patient location during evaluation: PACU  Patient participation: Yes- Able to Participate  Level of consciousness: awake and alert  Post-procedure vital signs: reviewed and stable  Pain management: adequate  Airway patency: patent    PONV status at discharge: No PONV  Anesthetic complications: no      Cardiovascular status: stable  Respiratory status: unassisted and spontaneous ventilation  Hydration status: euvolemic  Follow-up not needed.          Vitals Value Taken Time   /76 02/22/22 1447   Temp 36.6 °C (97.9 °F) 02/22/22 1445   Pulse 95 02/22/22 1449   Resp 38 02/22/22 1448   SpO2 99 % 02/22/22 1449   Vitals shown include unvalidated device data.      No case tracking events are documented in the log.      Pain/Kelsey Score: Pain Rating Prior to Med Admin: 6 (2/22/2022 12:56 PM)  Kelsey Score: 10 (2/22/2022 12:40 PM)

## 2022-02-22 NOTE — ANESTHESIA PROCEDURE NOTES
Intubation    Date/Time: 2/22/2022 10:07 AM  Performed by: Elias Reardon  Authorized by: Kayley Carlos MD     Intubation:     Induction:  Intravenous    Intubated:  Postinduction    Mask Ventilation:  Easy mask    Attempts:  1    Attempted By:  Student    Method of Intubation:  Direct    Blade:  Summers 2    Laryngeal View Grade: Grade I - full view of cords      Difficult Airway Encountered?: No      Complications:  None    Airway Device:  Oral endotracheal tube    Airway Device Size:  7.5    Style/Cuff Inflation:  Cuffed (inflated to minimal occlusive pressure)    Tube secured:  23    Secured at:  The lips    Placement Verified By:  Capnometry    Complicating Factors:  None    Findings Post-Intubation:  BS equal bilateral and atraumatic/condition of teeth unchanged

## 2022-02-22 NOTE — BRIEF OP NOTE
[facility] - Surgery ([facility])  Brief Operative Note    Surgery Date: 2/22/2022     Surgeon(s) and Role:     * Alex Scanlon Jr., MD - Primary     * Amee Franks MD - Resident - Assisting    Pre-op Diagnosis:  Bilateral recurrent inguinal hernia without obstruction or gangrene [K40.21]    Post-op Diagnosis:  Post-Op Diagnosis Codes:     * Bilateral recurrent inguinal hernia without obstruction or gangrene [K40.21]    Procedure(s) (LRB):  REPAIR, HERNIA, INGUINAL, BILATERAL, LAPAROSCOPIC w/ mesh (Bilateral)    Anesthesia: General    Operative Findings: Bilateral indirect inguinal hernias repaired laproscopically with mesh    Estimated Blood Loss: * No values recorded between 2/22/2022 10:33 AM and 2/22/2022 12:05 PM *         Specimens:   Specimen (24h ago, onward)            None            Discharge Note    OUTCOME: Patient tolerated treatment/procedure well without complication and is now ready for discharge.    DISPOSITION: Home or Self Care    FINAL DIAGNOSIS:  <principal problem not specified>    FOLLOWUP: In clinic    DISCHARGE INSTRUCTIONS:    Discharge Procedure Orders   Lifting restrictions   Order Comments: There are skin tapes on your skin (steri strips) leave those in place until they fall off on their own.   May shower in 48 hours. No submerging the incision in the water. May wash softly with water and soap.  Call with any redness, hotness, or drainage from the incision. Pain that is not well controlled with the prescribed medications or any fevers greater than 101.  No heavy lifting over 10 lbs for 6 weeks.    Please keep your follow up appointment  You may resume all mediations that you were taking at home.     Notify your health care provider if you experience any of the following:  increased confusion or weakness     Notify your health care provider if you experience any of the following:  persistent dizziness, light-headedness, or visual disturbances     Notify your health care provider if  you experience any of the following:  worsening rash     Notify your health care provider if you experience any of the following:  severe persistent headache     Notify your health care provider if you experience any of the following:  difficulty breathing or increased cough     Notify your health care provider if you experience any of the following:  redness, tenderness, or signs of infection (pain, swelling, redness, odor or green/yellow discharge around incision site)     Notify your health care provider if you experience any of the following:  severe uncontrolled pain     Notify your health care provider if you experience any of the following:  persistent nausea and vomiting or diarrhea     Notify your health care provider if you experience any of the following:  temperature >100.4

## 2022-02-22 NOTE — PLAN OF CARE
Pt POC explained, v/u. Discharge instructions reviewed, v/u. All questions answered. One filled prescription given. Pt discharged via wheelchair with PCT to private vehicle with family member in stable condition.

## 2022-02-22 NOTE — OP NOTE
DATE OF PROCEDURE: 02/22/2022  SERVICE: General Surgery.   Surgeon(s) and Role:     * Alex Scanlon Jr., MD - Primary     * Amee Franks MD - Resident - Assisting  PREOPERATIVE DIAGNOSIS: Bilateral inguinal hernias.   POSTOPERATIVE DIAGNOSIS: Bilateral inguinal hernias.   PROCEDURE: Laparoscopic repair of bilateral inguinal hernias with mesh.  ANESTHESIA: General endotracheal and local.   DESCRIPTION OF PROCEDURE: The patient was taken to the Operating Room, placed   under general anesthesia, and prepped and draped in sterile fashion. At this   time, an infraumbilical incision was made after infiltrating with local   anesthetic. This was carried down to the linea alba with electrocautery and   blunt dissection. An incision was made in the anterior fascia, just to the left  of the linea alba. At this time, the rectus muscle was then swept laterally   and elevated, and a dissecting balloon trocar was placed in the retrorectus   space. At this time, under direct visualization, the balloon was insufflated,   creating the retrorectus space without difficulty. The trocar was then inserted   into this retrorectus space, and the balloon was insufflated and the dissecting   balloon was removed. Once this was complete, further ports were placed in the   midline, 5 mm in nature, one 1 fingerbreadth above the pubic symphysis and one   between the suprapubic port and the infraumbilical port. These were placed   under direct visualization, after infiltrating with local anesthetic. Once the   ports were placed, we turned our attention to the left side.  We swept the peritoneum   down laterally, beginning at the anterior superior iliac spine and continuing this dissection medially   towards the cord. The cord was elevated. There was no  indirect hernia present. The peritoneum was  from the cord structures and   reduced back to the level of the umbilicus. The cord itself was skeletonized   and inspected, no signs  of any other abnormalities.  At this time, we continued dissection   medially towards the direct space. There was a moderate direct hernia   present, this was reduced and we cleared Dayday's ligament medially as well. Once we had completed   dissection on the left side, we turned our attention to the right side.  We again swept the peritoneum down laterally, beginning at the anterior superior iliac spine and continuing this dissection medially towards the cord. The cord was elevated. There was no indirect hernia present. The peritoneum was  from the cord structures and   reduced back to the level of the umbilicus. The cord itself was skeletonized   and inspected, no signs of any other abnormalities.  At this time, we continued dissection   medially towards the direct space. There was again a moderate direct hernia   present, this again was reduced and we cleared Dayday's ligament medially as well. we   proceeded with placement of mesh bilaterally. A piece of large 3DMAX mesh was   placed into the retrorectus space on both sides. Medially, it was brought   to midline and anteriorly to the first port. They were then tacked with two tacks   to Dayday's ligament medially, one to the rectus muscle anteriorly and then two   tacks laterally above the ASIS. At this time, the mesh was inspected, and it   was in very good position covering all defects. The sac had been dissected back   to the umbilicus and was well out of the way of the mesh repair. At this time   under direct visualization, the air was evacuated from the retrorectus space.   The ports were then removed. The retrorectus space was then infiltrated with   further 20 mL of local anesthetic and at this time, the infraumbilical port was   removed. The anterior fascia was closed with 0 Vicryl suture in figure-of-eight   fashion, closing the fascia of this incision; and the port sites were closed   with 4-0 Monocryl in subcuticular fashion. Mastisol and  Steri-Strips were then   placed. The patient was allowed to awake from general anesthesia and   transferred to bed for transport to Recovery.   COMPLICATIONS: None.   SPONGE COUNT: Correct.   BLOOD LOSS: 15 mL.   FLUIDS: Per Anesthesia.   BLOOD GIVEN: None.   DRAINS: None.   SPECIMENS: None.   CONDITION OF PATIENT: Good.   I was present for the entire procedure.

## 2022-02-22 NOTE — ANESTHESIA PREPROCEDURE EVALUATION
02/22/2022  Domenico Trevino is a 56 y.o., male.      Pre-op Assessment    I have reviewed the Patient Summary Reports.     I have reviewed the Nursing Notes. I have reviewed the NPO Status.   I have reviewed the Medications.     Review of Systems  Anesthesia Hx:  No problems with previous Anesthesia  History of prior surgery of interest to airway management or planning: Denies Family Hx of Anesthesia complications.   Denies Personal Hx of Anesthesia complications.   Cardiovascular:   Hypertension Denies MI.  Denies CAD.    ECG has been reviewed.    Pulmonary:  Pulmonary Normal  Denies COPD.  Denies Sleep Apnea.    Renal/:   Chronic Renal Disease, CRI renal calculi    Hepatic/GI:   Liver disease: alcoholic cirrhosis. S/p OLT 6/13   Musculoskeletal:  Musculoskeletal Normal    Neurological:  Neurology Normal Denies Seizures.    Endocrine:   Denies Diabetes.    Psych:   anxiety depression          Physical Exam  General: Well nourished, Cooperative, Alert and Oriented    Airway:  Mouth Opening: Normal  TM Distance: Normal  Tongue: Normal  Neck ROM: Normal ROM    Dental:  Intact    Chest/Lungs:  Clear to auscultation, Normal Respiratory Rate    Heart:  Rate: Normal  Rhythm: Regular Rhythm  Sounds: Normal        Anesthesia Plan  Type of Anesthesia, risks & benefits discussed:    Anesthesia Type: Gen Natural Airway, Gen ETT, MAC  Intra-op Monitoring Plan: Standard ASA Monitors  Post Op Pain Control Plan: multimodal analgesia and IV/PO Opioids PRN  Induction:  IV  Airway Plan: Direct, Post-Induction  Informed Consent: Informed consent signed with the Patient representative and all parties understand the risks and agree with anesthesia plan.  All questions answered.   ASA Score: 2  Day of Surgery Review of History & Physical: H&P Update referred to the surgeon/provider.    Ready For Surgery From Anesthesia  Perspective.     .

## 2022-03-04 ENCOUNTER — PATIENT MESSAGE (OUTPATIENT)
Dept: SURGERY | Facility: CLINIC | Age: 57
End: 2022-03-04
Payer: MEDICARE

## 2022-03-07 ENCOUNTER — OFFICE VISIT (OUTPATIENT)
Dept: SURGERY | Facility: CLINIC | Age: 57
End: 2022-03-07
Payer: MEDICARE

## 2022-03-07 VITALS
BODY MASS INDEX: 26.3 KG/M2 | TEMPERATURE: 98 F | SYSTOLIC BLOOD PRESSURE: 151 MMHG | WEIGHT: 198.44 LBS | DIASTOLIC BLOOD PRESSURE: 89 MMHG | HEART RATE: 80 BPM | HEIGHT: 73 IN

## 2022-03-07 DIAGNOSIS — Z09 POSTOP CHECK: Primary | ICD-10-CM

## 2022-03-07 PROCEDURE — 99024 PR POST-OP FOLLOW-UP VISIT: ICD-10-PCS | Mod: POP,,, | Performed by: SURGERY

## 2022-03-07 PROCEDURE — 99999 PR PBB SHADOW E&M-EST. PATIENT-LVL IV: ICD-10-PCS | Mod: PBBFAC,,, | Performed by: SURGERY

## 2022-03-07 PROCEDURE — 99999 PR PBB SHADOW E&M-EST. PATIENT-LVL IV: CPT | Mod: PBBFAC,,, | Performed by: SURGERY

## 2022-03-07 PROCEDURE — 99214 OFFICE O/P EST MOD 30 MIN: CPT | Mod: PBBFAC | Performed by: SURGERY

## 2022-03-07 PROCEDURE — 99024 POSTOP FOLLOW-UP VISIT: CPT | Mod: POP,,, | Performed by: SURGERY

## 2022-03-07 NOTE — PROGRESS NOTES
HPI:  The patient is status post-lap bilateral inguinal hernia repiars.  Was doing very well until a sneeze last week.  Some soreness higher now.  No bulge.    PHYSICAL EXAM:    In NAD  Incisions c/d/i    ASSESSMENT:    The patient is doing well after surgery.     PLAN:    Follow up PRN.  Call if no improvement in the next month pt to return for eval.  Activity: No heavy lifting for 4 weeks.        Alex Scanlon MD

## 2022-04-01 ENCOUNTER — PATIENT MESSAGE (OUTPATIENT)
Dept: TRANSPLANT | Facility: CLINIC | Age: 57
End: 2022-04-01
Payer: MEDICARE

## 2022-04-11 LAB
EXT ALBUMIN: 4.4
EXT ALKALINE PHOSPHATASE: 80
EXT ALT: 39
EXT AST: 46
EXT BASOPHIL%: 0.1
EXT BILIRUBIN TOTAL: 0.6
EXT BUN: 22
EXT CALCIUM: 9.1
EXT CHLORIDE: 105
EXT CO2: 23
EXT CREATININE: 1.29 MG/DL
EXT EOSINOPHIL%: 3.1
EXT GFR MDRD NON AF AMER: 58
EXT GLUCOSE: 83
EXT HEMATOCRIT: 46.2
EXT HEMOGLOBIN: 16
EXT LYMPH%: 28.8
EXT MONOCYTES%: 10
EXT PLATELETS: 193
EXT POTASSIUM: 4.4
EXT PROTEIN TOTAL: 7.1
EXT SEGS%: 58
EXT SODIUM: 140 MMOL/L
EXT TACROLIMUS LVL: 3
EXT WBC: 5.4

## 2022-04-18 ENCOUNTER — TELEPHONE (OUTPATIENT)
Dept: TRANSPLANT | Facility: CLINIC | Age: 57
End: 2022-04-18
Payer: MEDICARE

## 2022-04-18 ENCOUNTER — PATIENT MESSAGE (OUTPATIENT)
Dept: TRANSPLANT | Facility: CLINIC | Age: 57
End: 2022-04-18
Payer: MEDICARE

## 2022-04-18 NOTE — LETTER
April 18, 2022    Shin Trevino  [address] [facility] [address]          Dear Shin Trevino:  MRN: [id number]    This is a follow up to your recent labs, the labs are up a bit.  There are no medicine changes.  Please have your labs drawn again on 5/16/22.      If you cannot have your labs drawn on the scheduled date, it is your responsibility to call the transplant department to reschedule.  Please call [phone] and ask to speak to Jennifer LEACH   for all scheduling requests.     Sincerely,    Yancy CATALANN, RN        Your Liver Transplant Coordinator    [facility] [place]  [address] [place] [address]  [phone]

## 2022-04-18 NOTE — TELEPHONE ENCOUNTER
Letter sent to patient stating: Your labs have been reviewed by your Transplant physician, and since labs are up a bit, request repeat labs in one month, due 5/16/22          ---- Message from Mercedes Garcia MD sent at 4/15/2022  5:22 PM CDT -----  The Labs are up a bit- repeat in one month

## 2022-05-11 ENCOUNTER — PATIENT MESSAGE (OUTPATIENT)
Dept: RESEARCH | Facility: CLINIC | Age: 57
End: 2022-05-11
Payer: MEDICARE

## 2022-05-18 LAB
EXT ALBUMIN: 4.4
EXT ALKALINE PHOSPHATASE: 73
EXT ALT: 31
EXT AST: 24
EXT BASOPHIL%: 1.6
EXT BILIRUBIN TOTAL: 0.5
EXT BUN: 26
EXT CALCIUM: 9.4
EXT CHLORIDE: 105
EXT CO2: 25
EXT CREATININE: 1.3 MG/DL
EXT EOSINOPHIL%: 4.5
EXT GFR MDRD NON AF AMER: 57
EXT GLUCOSE: 103
EXT HEMATOCRIT: 52.1
EXT HEMOGLOBIN: 17.5
EXT LYMPH%: 24.3
EXT MONOCYTES%: 8.8
EXT PLATELETS: 206
EXT POTASSIUM: 5
EXT PROTEIN TOTAL: 7.3
EXT SEGS%: 60.6
EXT SODIUM: 139 MMOL/L
EXT TACROLIMUS LVL: 2.7
EXT WBC: 4.4

## 2022-05-30 ENCOUNTER — PATIENT MESSAGE (OUTPATIENT)
Dept: TRANSPLANT | Facility: CLINIC | Age: 57
End: 2022-05-30
Payer: MEDICARE

## 2022-05-30 ENCOUNTER — TELEPHONE (OUTPATIENT)
Dept: TRANSPLANT | Facility: CLINIC | Age: 57
End: 2022-05-30
Payer: MEDICARE

## 2022-05-30 NOTE — TELEPHONE ENCOUNTER
Letter sent to patient stating: Your labs have been reviewed by your Transplant physician, no action required. Next labs due 9/19/22            ----- Message from Mercedes Garcia MD sent at 5/27/2022  7:55 AM CDT -----  The Labs are stable - please let patient know.

## 2022-05-30 NOTE — LETTER
May 30, 2022    Shin Trevino  [address] [facility] [address]          Dear Shin Trevino:  MRN: [id number]    This is a follow up to your recent labs, your lab results were stable.  There are no medicine changes.  Please have your labs drawn again on 9/19/22.      If you cannot have your labs drawn on the scheduled date, it is your responsibility to call the transplant department to reschedule.  Please call [phone] and ask to speak to Jennifer LEACH   for all scheduling requests.     Sincerely,    Yancy CATALANN, RN        Your Liver Transplant Coordinator    [facility] [place]  [address] [place] [address]  [phone]

## 2022-06-14 LAB — PHOSPHATIDYLETHANOL (PETH): NOT DETECTED NG/ML

## 2022-09-05 ENCOUNTER — PATIENT MESSAGE (OUTPATIENT)
Dept: TRANSPLANT | Facility: CLINIC | Age: 57
End: 2022-09-05
Payer: MEDICARE

## 2022-09-20 LAB
EXT ALBUMIN: 4.3
EXT ALKALINE PHOSPHATASE: 75
EXT ALT: 37
EXT AST: 29
EXT BASOPHIL%: 1.2
EXT BILIRUBIN TOTAL: 1
EXT BUN: 30
EXT CALCIUM: 9.6
EXT CHLORIDE: 106
EXT CO2: 28
EXT CREATININE: 1.3 MG/DL
EXT EGFR NO RACE VARIABLE: 64
EXT EOSINOPHIL%: 2.8
EXT GLUCOSE: 93
EXT HEMATOCRIT: 53.4
EXT HEMOGLOBIN: 17.9
EXT LYMPH%: 25.4
EXT MONOCYTES%: 10.1
EXT PLATELETS: 197
EXT POTASSIUM: 4.4
EXT PROTEIN TOTAL: 6.9
EXT SEGS%: 60.3
EXT SODIUM: 139 MMOL/L
EXT TACROLIMUS LVL: 2.7
EXT WBC: 5

## 2022-09-26 ENCOUNTER — TELEPHONE (OUTPATIENT)
Dept: TRANSPLANT | Facility: CLINIC | Age: 57
End: 2022-09-26
Payer: MEDICARE

## 2022-09-26 NOTE — LETTER
September 26, 2022    Shin Trveino  [address] [facility] [address]          Dear Shin Trevino:  MRN: [id number]    This is a follow up to your recent labs, your lab results were stable.  There are no medicine changes.  Please have your labs drawn again on 1/9/2023.      If you cannot have your labs drawn on the scheduled date, it is your responsibility to call the transplant department to reschedule.  Please call [phone] and ask to speak to Jennifer LEACH   for all scheduling requests.     Sincerely,    Yancy CATALANN, RN        Your Liver Transplant Coordinator    [facility] [place]  [address] [place] [address]  [phone]

## 2022-10-13 ENCOUNTER — TELEPHONE (OUTPATIENT)
Dept: TRANSPLANT | Facility: CLINIC | Age: 57
End: 2022-10-13
Payer: MEDICARE

## 2022-11-02 ENCOUNTER — OFFICE VISIT (OUTPATIENT)
Dept: TRANSPLANT | Facility: CLINIC | Age: 57
End: 2022-11-02
Payer: MEDICARE

## 2022-11-02 VITALS
BODY MASS INDEX: 28.27 KG/M2 | SYSTOLIC BLOOD PRESSURE: 151 MMHG | WEIGHT: 208.75 LBS | DIASTOLIC BLOOD PRESSURE: 81 MMHG | TEMPERATURE: 98 F | HEART RATE: 99 BPM | OXYGEN SATURATION: 97 % | RESPIRATION RATE: 18 BRPM | HEIGHT: 72 IN

## 2022-11-02 DIAGNOSIS — T86.49 OTHER COMPLICATION OF LIVER TRANSPLANT: ICD-10-CM

## 2022-11-02 DIAGNOSIS — Z29.89 PROPHYLACTIC IMMUNOTHERAPY: Primary | ICD-10-CM

## 2022-11-02 DIAGNOSIS — Z94.4 S/P LIVER TRANSPLANT: Chronic | ICD-10-CM

## 2022-11-02 PROCEDURE — 99214 PR OFFICE/OUTPT VISIT, EST, LEVL IV, 30-39 MIN: ICD-10-PCS | Mod: S$PBB,,, | Performed by: INTERNAL MEDICINE

## 2022-11-02 PROCEDURE — 99999 PR PBB SHADOW E&M-EST. PATIENT-LVL IV: ICD-10-PCS | Mod: PBBFAC,,, | Performed by: INTERNAL MEDICINE

## 2022-11-02 PROCEDURE — 99999 PR PBB SHADOW E&M-EST. PATIENT-LVL IV: CPT | Mod: PBBFAC,,, | Performed by: INTERNAL MEDICINE

## 2022-11-02 PROCEDURE — 99214 OFFICE O/P EST MOD 30 MIN: CPT | Mod: PBBFAC,PN | Performed by: INTERNAL MEDICINE

## 2022-11-02 PROCEDURE — 99214 OFFICE O/P EST MOD 30 MIN: CPT | Mod: S$PBB,,, | Performed by: INTERNAL MEDICINE

## 2022-11-02 RX ORDER — AMOXICILLIN 500 MG
CAPSULE ORAL DAILY
COMMUNITY

## 2022-11-02 NOTE — PATIENT INSTRUCTIONS
No change in IS- continue prograf and cellcept  Colonoscopy as scheduled  Bone density 2024  Dermatology annually and wear sunscreen  Monitor BP and cholesterol

## 2022-11-02 NOTE — LETTER
November 2, 2022        Ron Saeed III  [address]  [facility] [address]  Phone: [phone]  Fax: [phone]             [facility] - Liver Transplant  [address] [address]  Phone: [phone]  Fax: [phone]   Patient: Domenico Trevino   MR Number: [id number]   YOB: 1965   Date of Visit: 11/2/2022       Dear Dr. Ron Saeed III    Thank you for referring Domenico Trevino to me for evaluation. Attached you will find relevant portions of my assessment and plan of care.    If you have questions, please do not hesitate to call me. I look forward to following Domenico Trevino along with you.    Sincerely,    Mercedes Garcia MD    Enclosure    If you would like to receive this communication electronically, please contact [email or web address] or [phone] to request [employer] access.    [employer] is a tool which provides read-only access to select patient information with whom you have a relationship. Its easy to use and provides real time access to review your patients record including encounter summaries, notes, results, and demographic information.    If you feel you have received this communication in error or would no longer like to receive these types of communications, please e-mail [email or web address]

## 2022-11-02 NOTE — PROGRESS NOTES
Transplant Hepatology  Liver Transplant Recipient Follow-up    Transplant Date: 2013  UNOS Native Liver Dx: Alcoholic Cirrhosis    Domenico is here for follow up of his liver transplant.    ORGAN: LIVER  Whole or Partial: whole liver  Donor Type:  - brain death  CDC High Risk: no  Donor CMV Status: positive  Donor HCV Status: negative  Donor HBcAb: negative  Biliary Anastomosis: end to end  Arterial Anatomy: standard  IVC reconstruction: end to end ivc  Portal vein status: patent    He has had the following complications since transplant: renal insufficiency.     Subjective:     Interval History: Domenico is now 9 years post liver transplant. Currently, he is doing well. He feels well overall. He is not drinking alcohol. He lost about 40 lbs 2 years ago through diet and exercise. He continues to diet and exercise regularly. He had a REPAIR, HERNIA, INGUINAL, BILATERAL, LAPAROSCOPIC w/ mesh (Bilateral) 22. It went uncomplicated.    Health Maintenance  Dermatology: had precancerous lesions removed but no cancerous lesions  Colonoscopy : 3 small polyps removed. A repeat was recommended in -scheduled   Bone density : normal    Allograft function 22: Tbil 1.0, ALT 37, AST 29, ALKP 75,  Creat 1.3 (sees local nephrologist but no recent f/u);  Pg level 2.7.   Immunosuppression: PG and cellcept 500 mg bid.   .    Review of Systems   Constitutional: Negative.    HENT: Negative.     Eyes: Negative.    Respiratory: Negative.     Cardiovascular: Negative.    Gastrointestinal: Negative.    Genitourinary: Negative.    Musculoskeletal: Negative.    Skin: Negative.    Neurological: Negative.    Psychiatric/Behavioral: Negative.       Objective:     Vitals:    22 1448   BP: (!) 151/81   Pulse: 99   Resp: 18   Temp: 97.6 °F (36.4 °C)     Physical Exam  Vitals reviewed.   Constitutional:       Appearance: He is well-developed.   HENT:      Head: Normocephalic and atraumatic.   Eyes:       General: No scleral icterus.     Conjunctiva/sclera: Conjunctivae normal.      Pupils: Pupils are equal, round, and reactive to light.   Neck:      Thyroid: No thyromegaly.   Cardiovascular:      Rate and Rhythm: Normal rate and regular rhythm.      Heart sounds: Normal heart sounds.   Pulmonary:      Effort: Pulmonary effort is normal.      Breath sounds: Normal breath sounds. No rales.   Abdominal:      General: Bowel sounds are normal. There is no distension.      Palpations: Abdomen is soft. There is no mass.      Tenderness: There is no abdominal tenderness.   Musculoskeletal:         General: Normal range of motion.      Cervical back: Normal range of motion and neck supple.   Skin:     General: Skin is warm and dry.      Findings: No rash.   Neurological:      Mental Status: He is alert and oriented to person, place, and time.     Lab Results   Component Value Date    BILITOT 0.5 06/15/2015    AST 38 01/25/2016    ALT 37 01/25/2016    ALKPHOS 104 06/15/2015    CREATININE 1.3 02/21/2022    CREATININE 1.9 01/25/2016    ALBUMIN 4.1 02/21/2022    ALBUMIN 4.1 01/25/2016     Lab Results   Component Value Date    WBC 5.96 02/21/2022    WBC 5.2 01/25/2016    HGB 17.1 02/21/2022    HCT 50.5 02/21/2022    HCT 26 (L) 06/05/2013     02/21/2022     Lab Results   Component Value Date    TACROLIMUS 6.7 06/15/2015       Assessment/Plan:     The patient is now 9 years post liver transplant. Current recommendations:  1. Complication of liver transplant: CRI, ongoing: continue to minimize prograf with a target of 2-4. Continue cellcept 500 mg bid  3. HTN, no longer on antihypertensives  5. S/p liver transplant and control of IS: continue current dose of pg and cellcept as above; liver tx labs every 12 weeks indefinitely since has renal insufficiency  6. Health maintenance: the patient should continue to see a dermatologist annually to screen for skin cancer. Should he develop more concerning lesions, will lower  cellcept to 250 mg bid; wear sunscreen; He should repeat a colonoscopy as scheduled 12/2022.  7. R/O osteoporosis. Repeat bone density 2024   8. Alcohol abuse in remission: check peth test periodically  9. Hx HLD: now off meds; monitor    Return one year..    Mercedes Garcia MD           [facility] Patient Status  Functional Status: 100% - Normal, no complaints, no evidence of disease  Physical Capacity: No Limitations    . .

## 2022-12-22 ENCOUNTER — PATIENT MESSAGE (OUTPATIENT)
Dept: TRANSPLANT | Facility: CLINIC | Age: 57
End: 2022-12-22
Payer: MEDICARE

## 2022-12-22 DIAGNOSIS — Z94.4 STATUS POST LIVER TRANSPLANT: ICD-10-CM

## 2022-12-24 RX ORDER — TACROLIMUS 1 MG/1
2 CAPSULE ORAL EVERY 12 HOURS
Qty: 120 CAPSULE | Refills: 11 | Status: SHIPPED | OUTPATIENT
Start: 2022-12-24 | End: 2023-07-08 | Stop reason: SDUPTHER

## 2023-01-17 ENCOUNTER — TELEPHONE (OUTPATIENT)
Dept: TRANSPLANT | Facility: CLINIC | Age: 58
End: 2023-01-17
Payer: OTHER GOVERNMENT

## 2023-01-17 ENCOUNTER — PATIENT MESSAGE (OUTPATIENT)
Dept: TRANSPLANT | Facility: CLINIC | Age: 58
End: 2023-01-17
Payer: OTHER GOVERNMENT

## 2023-01-17 LAB
EXT ALBUMIN: 4
EXT ALKALINE PHOSPHATASE: 74
EXT ALT: 42
EXT AST: 35
EXT BASOPHIL%: 0.8
EXT BILIRUBIN TOTAL: 0.7
EXT BUN: 29
EXT CALCIUM: 9.4
EXT CHLORIDE: 105
EXT CO2: 24
EXT CREATININE: 1.26 MG/DL
EXT EGFR NO RACE VARIABLE: 67
EXT EOSINOPHIL%: 10.4
EXT GLUCOSE: 100
EXT HEMATOCRIT: 51.2
EXT HEMOGLOBIN: 18.3
EXT LYMPH%: 26.6
EXT MONOCYTES%: 7.2
EXT PLATELETS: 185
EXT POTASSIUM: 4.7
EXT PROTEIN TOTAL: 7
EXT SEGS%: 54.8
EXT SODIUM: 138 MMOL/L
EXT TACROLIMUS LVL: 2.9
EXT WBC: 5.1
PHOSPHATIDYLETHANOL (PETH): <10 NG/ML

## 2023-01-17 NOTE — LETTER
January 17, 2023    Shin Trevino  [address] [facility] [address]          Dear Shin Trevino:  MRN: [id number]    This is a follow up to your recent labs, your lab results were stable.  There are no medicine changes.  Please have your labs drawn again on 2/13/2023.      If you cannot have your labs drawn on the scheduled date, it is your responsibility to call the transplant department to reschedule.  Please call [phone] and ask to speak to Jennifer LEACH   for all scheduling requests.     Sincerely,    Yancy CATALANN, RN        Your Liver Transplant Coordinator    [facility] [place]  [address] [place] [address]  [phone]

## 2023-01-17 NOTE — TELEPHONE ENCOUNTER
Letter sent to patient stating: Your labs have been reviewed by your Transplant physician, no action required. Next labs due 2/13/2023          ----- Message from Mercedes Garcia MD sent at 1/17/2023  2:58 PM CST -----  The Labs are up a bit; repeat labs in one month - please let patient know.

## 2023-02-07 ENCOUNTER — PATIENT MESSAGE (OUTPATIENT)
Dept: TRANSPLANT | Facility: CLINIC | Age: 58
End: 2023-02-07
Payer: OTHER GOVERNMENT

## 2023-02-16 DIAGNOSIS — Z94.4 STATUS POST LIVER TRANSPLANT: Primary | ICD-10-CM

## 2023-02-16 DIAGNOSIS — E87.5 HYPERKALEMIA: ICD-10-CM

## 2023-02-16 LAB
EXT ALBUMIN: 4.4
EXT ALKALINE PHOSPHATASE: 76
EXT ALT: 40
EXT AST: 33
EXT BASOPHIL%: 1.5
EXT BILIRUBIN TOTAL: 0.9
EXT BUN: 32
EXT CALCIUM: 9.8
EXT CHLORIDE: 106
EXT CO2: 25
EXT CREATININE: 1.48 MG/DL
EXT EOSINOPHIL%: 8.3
EXT GFR MDRD NON AF AMER: 55
EXT GLUCOSE: 96
EXT HEMATOCRIT: 54.5
EXT HEMOGLOBIN: 18.2
EXT LYMPH%: 27.8
EXT MONOCYTES%: 9.1
EXT PLATELETS: 191
EXT POTASSIUM: 5.9
EXT PROTEIN TOTAL: 7.1
EXT SEGS%: 53.1
EXT SODIUM: 141 MMOL/L
EXT TACROLIMUS LVL: 3.3
EXT WBC: 4.7

## 2023-02-16 NOTE — TELEPHONE ENCOUNTER
Patient made aware that due to elevated potassium level he will need to take kayexalate as per liver txp protocol with repeat labs next day, patient verbalized understanding.

## 2023-02-17 ENCOUNTER — PATIENT MESSAGE (OUTPATIENT)
Dept: TRANSPLANT | Facility: CLINIC | Age: 58
End: 2023-02-17
Payer: OTHER GOVERNMENT

## 2023-02-27 ENCOUNTER — PATIENT MESSAGE (OUTPATIENT)
Dept: TRANSPLANT | Facility: CLINIC | Age: 58
End: 2023-02-27
Payer: OTHER GOVERNMENT

## 2023-02-28 LAB — EXT POTASSIUM: 4.8

## 2023-03-01 ENCOUNTER — TELEPHONE (OUTPATIENT)
Dept: TRANSPLANT | Facility: CLINIC | Age: 58
End: 2023-03-01
Payer: OTHER GOVERNMENT

## 2023-03-01 NOTE — LETTER
March 1, 2023    Shin Trevino  [address] [facility] [address]          Dear Shin Trevino:  MRN: [id number]    This is a follow up to your recent labs, your lab results were stable.  There are no medicine changes.  Please have your labs drawn again on 6/26/2023.      If you cannot have your labs drawn on the scheduled date, it is your responsibility to call the transplant department to reschedule.  Please call [phone] and ask to speak to Jennifer LEACH   for all scheduling requests.     Sincerely,    Yancy CATALANN, RN        Your Liver Transplant Coordinator    [facility] [place] [place]  [address] [place] [address]  [phone]

## 2023-03-01 NOTE — TELEPHONE ENCOUNTER
Letter sent to patient stating: Your labs have been reviewed by your Transplant physician, no action required. Next labs due 6/26/2023          ----- Message from Mercedes Garcia MD sent at 3/1/2023  1:02 PM CST -----  sure  ----- Message -----  From: Yancy Sanchez RN  Sent: 3/1/2023  12:55 PM CST  To: Mercedes Garcia MD    Repeat K normal, continue to protocol labs?

## 2023-06-04 ENCOUNTER — PATIENT MESSAGE (OUTPATIENT)
Dept: TRANSPLANT | Facility: CLINIC | Age: 58
End: 2023-06-04
Payer: OTHER GOVERNMENT

## 2023-06-29 ENCOUNTER — TELEPHONE (OUTPATIENT)
Dept: TRANSPLANT | Facility: CLINIC | Age: 58
End: 2023-06-29
Payer: OTHER GOVERNMENT

## 2023-06-29 ENCOUNTER — PATIENT MESSAGE (OUTPATIENT)
Dept: TRANSPLANT | Facility: CLINIC | Age: 58
End: 2023-06-29
Payer: OTHER GOVERNMENT

## 2023-06-29 LAB
EXT ALBUMIN: 4.6
EXT ALKALINE PHOSPHATASE: 90
EXT ALT: 53
EXT AST: 49
EXT BASOPHIL%: 1.5
EXT BILIRUBIN TOTAL: 0.8
EXT BUN: 30
EXT CALCIUM: 10
EXT CHLORIDE: 104
EXT CO2: 24
EXT CREATININE: 1.53 MG/DL
EXT EGFR NO RACE VARIABLE: 53
EXT EOSINOPHIL%: 3.4
EXT GLUCOSE: 91
EXT HEMATOCRIT: 55
EXT HEMOGLOBIN: 18.1
EXT LYMPH%: 27.7
EXT MONOCYTES%: 8.6
EXT PLATELETS: 199
EXT POTASSIUM: 5.8
EXT PROTEIN TOTAL: 7.3
EXT SEGS%: 58.6
EXT SODIUM: 139 MMOL/L
EXT TACROLIMUS LVL: 4.2
EXT WBC: 4.7
PHOSPHATIDYLETHANOL (PETH): NEGATIVE NG/ML

## 2023-06-29 NOTE — TELEPHONE ENCOUNTER
Federicor received patients labs via fax, dated 6/26/23. Elevated K level of 5.8. per hyperkalemia protocol, fedreicor called into his local pharmacy [facility] a kayexalate script as a one time dose with repeat labs requested 6/30/23 to the patient.

## 2023-07-05 ENCOUNTER — TELEPHONE (OUTPATIENT)
Dept: TRANSPLANT | Facility: CLINIC | Age: 58
End: 2023-07-05
Payer: OTHER GOVERNMENT

## 2023-07-05 LAB — EXT POTASSIUM: 4.9

## 2023-07-05 NOTE — LETTER
July 5, 2023    Shin Trevino  [address] [facility] [address]          Dear Shin Trevino:  MRN: [id number]    This is a follow up to your recent labs, your lab results were stable.  There are no medicine changes.  Please have your labs drawn again on 10/2/2023.      If you cannot have your labs drawn on the scheduled date, it is your responsibility to call the transplant department to reschedule.  Please call [phone] and ask to speak to Jennifer LEACH   for all scheduling requests.     Sincerely,    Yancy CATALANN, RN        Your Liver Transplant Coordinator    [facility] [place]  [address] [place] [address]  [phone]

## 2023-07-05 NOTE — TELEPHONE ENCOUNTER
Letter sent to patient stating: Your labs have been reviewed by your Transplant physician, no action required. Next labs due 10/2/2023            ----- Message from Mercedes Garcia MD sent at 7/3/2023  8:40 PM CDT -----  The Labs are stable - please let patient know.

## 2023-07-08 ENCOUNTER — PATIENT MESSAGE (OUTPATIENT)
Dept: TRANSPLANT | Facility: CLINIC | Age: 58
End: 2023-07-08
Payer: OTHER GOVERNMENT

## 2023-07-08 DIAGNOSIS — Z94.4 STATUS POST LIVER TRANSPLANT: ICD-10-CM

## 2023-07-08 NOTE — TELEPHONE ENCOUNTER
[facility] [place]                I could aslo could use some help with a prescription for Cellcept and Prograf. [facility] but after [facility] hit, I was unable to get an appointment for two years and had to start getting prescriptions through [facility] but my regular insurance won't cover both. I've been paying out of pocket for Cellcept for the last year or so. [facility] was [facility] able [facility] with [facility] and found out that [facility] will [facility] prescriptions written by Dr. Garcia. I can take a prescription from her [facility] to [facility] [place] and get them filled there. So, if you could mail me a prescription for those, it would be a tremendous help. Thank you for everything you do!

## 2023-07-09 RX ORDER — MYCOPHENOLATE MOFETIL 250 MG/1
500 CAPSULE ORAL 2 TIMES DAILY
Qty: 120 CAPSULE | Refills: 11 | Status: SHIPPED | OUTPATIENT
Start: 2023-07-09

## 2023-07-09 RX ORDER — TACROLIMUS 1 MG/1
2 CAPSULE ORAL EVERY 12 HOURS
Qty: 120 CAPSULE | Refills: 11 | Status: SHIPPED | OUTPATIENT
Start: 2023-07-09

## 2023-10-06 ENCOUNTER — PATIENT MESSAGE (OUTPATIENT)
Dept: TRANSPLANT | Facility: CLINIC | Age: 58
End: 2023-10-06
Payer: MEDICARE

## 2023-10-06 LAB
EXT ALBUMIN: 4.8
EXT ALKALINE PHOSPHATASE: 95
EXT ALT: 41
EXT AST: 27
EXT BASOPHIL%: 1
EXT BILIRUBIN TOTAL: 0.7
EXT BUN: 35
EXT CALCIUM: 10.2
EXT CHLORIDE: 101
EXT CO2: 23
EXT CREATININE: 1.41 MG/DL
EXT EGFR NO RACE VARIABLE: 58
EXT EOSINOPHIL%: 2
EXT GLUCOSE: 82
EXT HEMATOCRIT: 58.2
EXT HEMOGLOBIN: 19.6
EXT LYMPH%: 19
EXT MONOCYTES%: 8
EXT PLATELETS: 181
EXT POTASSIUM: 4.6
EXT PROTEIN TOTAL: 7.1
EXT SEGS%: 70
EXT SODIUM: 140 MMOL/L
EXT TACROLIMUS LVL: 2.1
EXT WBC: 5.7
PHOSPHATIDYLETHANOL (PETH): NEGATIVE NG/ML

## 2023-10-09 ENCOUNTER — TELEPHONE (OUTPATIENT)
Dept: TRANSPLANT | Facility: CLINIC | Age: 58
End: 2023-10-09
Payer: MEDICARE

## 2023-10-09 NOTE — TELEPHONE ENCOUNTER
Letter sent to patient stating: Your labs have been reviewed by your Transplant physician, no action required. Next labs due 2/12/2024          ----- Message from Mercedes Garcia MD sent at 10/8/2023  2:37 PM CDT -----  The Labs are stable - please let patient know.

## 2023-10-09 NOTE — LETTER
October 9, 2023    Shin Trevino  [address] [facility] [address]          Dear Shin Trevino:  MRN: [id number]    This is a follow up to your recent labs, your lab results were stable.  There are no medicine changes.  Please have your labs drawn again on 2/12/2024.      If you cannot have your labs drawn on the scheduled date, it is your responsibility to call the transplant department to reschedule.  Please call [phone] and ask to speak to Jennifer LEACH   for all scheduling requests.     Sincerely,  Yancy CATALANN, RN        Your Liver Transplant Coordinator    [facility] [place]  [address] [place] [address]  [phone]

## 2023-11-13 ENCOUNTER — OFFICE VISIT (OUTPATIENT)
Dept: TRANSPLANT | Facility: CLINIC | Age: 58
End: 2023-11-13
Payer: MEDICARE

## 2023-11-13 ENCOUNTER — TELEPHONE (OUTPATIENT)
Dept: TRANSPLANT | Facility: CLINIC | Age: 58
End: 2023-11-13
Payer: MEDICARE

## 2023-11-13 VITALS
DIASTOLIC BLOOD PRESSURE: 100 MMHG | OXYGEN SATURATION: 99 % | SYSTOLIC BLOOD PRESSURE: 175 MMHG | HEIGHT: 72 IN | TEMPERATURE: 98 F | RESPIRATION RATE: 19 BRPM | BODY MASS INDEX: 27.47 KG/M2 | WEIGHT: 202.81 LBS | HEART RATE: 66 BPM

## 2023-11-13 DIAGNOSIS — Z94.4 S/P LIVER TRANSPLANT: Chronic | ICD-10-CM

## 2023-11-13 DIAGNOSIS — Z79.818 LONG TERM (CURRENT) USE OF OTHER AGENTS AFFECTING ESTROGEN RECEPTORS AND ESTROGEN LEVELS: ICD-10-CM

## 2023-11-13 DIAGNOSIS — N18.9 CHRONIC RENAL IMPAIRMENT, UNSPECIFIED CKD STAGE: ICD-10-CM

## 2023-11-13 DIAGNOSIS — Z29.89 PROPHYLACTIC IMMUNOTHERAPY: Primary | ICD-10-CM

## 2023-11-13 DIAGNOSIS — T86.49 OTHER COMPLICATION OF LIVER TRANSPLANT: ICD-10-CM

## 2023-11-13 DIAGNOSIS — I15.9 SECONDARY HYPERTENSION: Chronic | ICD-10-CM

## 2023-11-13 PROCEDURE — 99999 PR PBB SHADOW E&M-EST. PATIENT-LVL V: CPT | Mod: PBBFAC,,, | Performed by: INTERNAL MEDICINE

## 2023-11-13 PROCEDURE — 99999 PR PBB SHADOW E&M-EST. PATIENT-LVL V: ICD-10-PCS | Mod: PBBFAC,,, | Performed by: INTERNAL MEDICINE

## 2023-11-13 PROCEDURE — 99214 PR OFFICE/OUTPT VISIT, EST, LEVL IV, 30-39 MIN: ICD-10-PCS | Mod: S$PBB,,, | Performed by: INTERNAL MEDICINE

## 2023-11-13 PROCEDURE — 99215 OFFICE O/P EST HI 40 MIN: CPT | Mod: PBBFAC,PN | Performed by: INTERNAL MEDICINE

## 2023-11-13 PROCEDURE — 99214 OFFICE O/P EST MOD 30 MIN: CPT | Mod: S$PBB,,, | Performed by: INTERNAL MEDICINE

## 2023-11-13 RX ORDER — UBIDECARENONE 30 MG
30 CAPSULE ORAL 3 TIMES DAILY
COMMUNITY

## 2023-11-13 RX ORDER — PROPRANOLOL HYDROCHLORIDE 10 MG/1
1 TABLET ORAL DAILY
COMMUNITY
Start: 2023-03-28

## 2023-11-13 NOTE — PATIENT INSTRUCTIONS
Labs every 3 months indefinitely due to kidney disease  Monitor BP at home  Colonoscopy 12/27  See derm now  Bone density 2024  Return 1 year

## 2023-11-13 NOTE — TELEPHONE ENCOUNTER
A 1 year recall was entered for Mr. Melissa and he was given orders to have a done density in his home town   Spoke with patient regarding Dr. Robison's recommendations. Patient verbalized understanding. No questions. Patient reports that she decided to restart Bentyl as she is now having abd pain. Will only take BID instead of QID to avoid diarrhea. Patient reports that it seems to be helping.     NAVIN

## 2023-11-13 NOTE — PROGRESS NOTES
Transplant Hepatology  Liver Transplant Recipient Follow-up    Transplant Date: 2013  UNOS Native Liver Dx: Alcoholic Cirrhosis    Domenico is here for follow up of his liver transplant.    ORGAN: LIVER  Whole or Partial: whole liver  Donor Type:  - brain death  CDC High Risk: no  Donor CMV Status: positive  Donor HCV Status: negative  Donor HBcAb: negative  Biliary Anastomosis: end to end  Arterial Anatomy: standard  IVC reconstruction: end to end ivc  Portal vein status: patent    He has had the following complications since transplant: renal insufficiency.     Subjective:     Interval History: Domenico is now 10 years and 5 months post liver transplant. Currently, he is doing well. He feels well overall. He is not drinking alcohol. He lost about 40 lbs 2 years ago through diet and exercise. He continues to diet and exercise regularly. He had a REPAIR, HERNIA, INGUINAL, BILATERAL, LAPAROSCOPIC w/ mesh (Bilateral) 22. It went uncomplicated.    Health Maintenance  Dermatology: had precancerous lesions removed but no cancerous lesions; now overdue for skin check  Colonoscopy 2016: 3 small polyps removed. A repeat was done -no polyps and next repeat recommended   Bone density : normal; repeat due   CT lung to screen for lung cancer (hx smoking)    Allograft function 10/2/23: Tbil 0.7, ALT 41, AST 27, ALKP 95,  Creat 1.41 (sees local nephrologist but no recent f/u);  Pg level 2.1.   Immunosuppression: PG and cellcept 500 mg bid.   .    Review of Systems   Constitutional: Negative.    HENT: Negative.     Eyes: Negative.    Respiratory: Negative.     Cardiovascular: Negative.    Gastrointestinal: Negative.    Genitourinary: Negative.    Musculoskeletal: Negative.    Skin: Negative.    Neurological: Negative.    Psychiatric/Behavioral: Negative.         Objective:     Vitals:    23 1400   BP: (!) 175/100   Pulse: 66   Resp: 19   Temp: 97.5 °F (36.4 °C)     Physical  Exam  Vitals reviewed.   Constitutional:       Appearance: He is well-developed.   HENT:      Head: Normocephalic and atraumatic.   Eyes:      General: No scleral icterus.     Conjunctiva/sclera: Conjunctivae normal.      Pupils: Pupils are equal, round, and reactive to light.   Neck:      Thyroid: No thyromegaly.   Cardiovascular:      Rate and Rhythm: Normal rate and regular rhythm.      Heart sounds: Normal heart sounds.   Pulmonary:      Effort: Pulmonary effort is normal.      Breath sounds: Normal breath sounds. No rales.   Abdominal:      General: Bowel sounds are normal. There is no distension.      Palpations: Abdomen is soft. There is no mass.      Tenderness: There is no abdominal tenderness.   Musculoskeletal:         General: Normal range of motion.      Cervical back: Normal range of motion and neck supple.   Skin:     General: Skin is warm and dry.      Findings: No rash.   Neurological:      Mental Status: He is alert and oriented to person, place, and time.       Lab Results   Component Value Date    BILITOT 0.5 06/15/2015    AST 38 01/25/2016    ALT 37 01/25/2016    ALKPHOS 104 06/15/2015    CREATININE 1.3 02/21/2022    CREATININE 1.9 01/25/2016    ALBUMIN 4.1 02/21/2022    ALBUMIN 4.1 01/25/2016     Lab Results   Component Value Date    WBC 5.96 02/21/2022    WBC 5.2 01/25/2016    HGB 17.1 02/21/2022    HCT 50.5 02/21/2022    HCT 26 (L) 06/05/2013     02/21/2022     Lab Results   Component Value Date    TACROLIMUS 6.7 06/15/2015       Assessment/Plan:     The patient is now 10 years and 5 months post liver transplant. Current recommendations:  1. Complication of liver transplant: CRI, ongoing: continue to minimize prograf with a target of 2-4. Continue cellcept 500 mg bid  3. HTN, no longer on antihypertensives; monitor BP since elevated today  5. S/p liver transplant and control of IS: continue current dose of pg and cellcept as above; liver tx labs every 12 weeks indefinitely since has  renal insufficiency  6. Health maintenance: the patient should continue to see a dermatologist annually to screen for skin cancer (overdue). Should he develop more concerning lesions, will lower cellcept to 250 mg bid; wear sunscreen; He should repeat a colonoscopy 12/27.  7. R/O osteoporosis. Repeat bone density 2024   8. Alcohol abuse in remission: check peth test periodically  9. Hx HLD: now off meds; monitor    Return one year.  A total of 35 minutes was spent reviewing the chart, labs, test results, examining the patient and counseling the patient about various compliclations of lvier transplant    Mercedes Garcia MD           [facility] [email or web address]] Patient Status  Functional Status: 100% - Normal, no complaints, no evidence of disease  Physical Capacity: No Limitations    . .

## 2023-11-13 NOTE — LETTER
November 13, 2023        Ron Saeed III  [address]  [facility] [address]  Phone: [phone]  Fax: [phone]             [facility]  [address] [address]  Phone: [phone]  Fax: [phone]   Patient: Domenico Trevino   MR Number: [id number]   YOB: 1965   Date of Visit: 11/13/2023       Dear Dr. Ron Saeed III    Thank you for referring Domenico Trevino to me for evaluation. Attached you will find relevant portions of my assessment and plan of care.    If you have questions, please do not hesitate to call me. I look forward to following Domenico Trevino along with you.    Sincerely,    Mercedes Garcia MD    Enclosure    If you would like to receive this communication electronically, please contact [email or web address] or [phone] to request [employer] [facility] [employer] access.    [employer] [facility] [employer] is a tool which provides read-only access to select patient information with whom you have a relationship. Its easy to use and provides real time access to review your patients record including encounter summaries, notes, results, and demographic information.    If you feel you have received this communication in error or would no longer like to receive these types of communications, please e-mail [email or web address]

## 2024-01-24 ENCOUNTER — PATIENT MESSAGE (OUTPATIENT)
Dept: TRANSPLANT | Facility: CLINIC | Age: 59
End: 2024-01-24
Payer: MEDICARE

## 2024-02-12 ENCOUNTER — PATIENT MESSAGE (OUTPATIENT)
Dept: TRANSPLANT | Facility: CLINIC | Age: 59
End: 2024-02-12
Payer: MEDICARE

## 2024-02-14 LAB
ALBUMIN SERPL-MCNC: 4.7 G/DL (ref 3.8–4.9)
ALBUMIN/GLOB SERPL: 2 {RATIO} (ref 1.2–2.2)
ALP SERPL-CCNC: 97 IU/L (ref 44–121)
ALT SERPL-CCNC: 40 IU/L (ref 0–44)
AST SERPL-CCNC: 23 IU/L (ref 0–40)
BASOPHILS # BLD AUTO: 0.1 X10E3/UL (ref 0–0.2)
BASOPHILS NFR BLD AUTO: 1 %
BILIRUB SERPL-MCNC: 0.7 MG/DL (ref 0–1.2)
BUN SERPL-MCNC: 32 MG/DL (ref 6–24)
BUN/CREAT SERPL: 26 (ref 9–20)
CALCIUM SERPL-MCNC: 10 MG/DL (ref 8.7–10.2)
CHLORIDE SERPL-SCNC: 102 MMOL/L (ref 96–106)
CO2 SERPL-SCNC: 24 MMOL/L (ref 20–29)
CREAT SERPL-MCNC: 1.21 MG/DL (ref 0.76–1.27)
EOSINOPHIL # BLD AUTO: 0.2 X10E3/UL (ref 0–0.4)
EOSINOPHIL NFR BLD AUTO: 3 %
ERYTHROCYTE [DISTWIDTH] IN BLOOD BY AUTOMATED COUNT: 12.8 % (ref 11.6–15.4)
EST. GFR  (NO RACE VARIABLE): 70 ML/MIN/1.73
GLOBULIN SER CALC-MCNC: 2.4 G/DL (ref 1.5–4.5)
GLUCOSE SERPL-MCNC: 91 MG/DL (ref 70–99)
HCT VFR BLD AUTO: 56.7 % (ref 37.5–51)
HGB BLD-MCNC: 19.1 G/DL (ref 13–17.7)
IMM GRANULOCYTES # BLD AUTO: 0 X10E3/UL (ref 0–0.1)
IMM GRANULOCYTES NFR BLD AUTO: 1 %
LYMPHOCYTES # BLD AUTO: 1.5 X10E3/UL (ref 0.7–3.1)
LYMPHOCYTES NFR BLD AUTO: 25 %
MCH RBC QN AUTO: 31.4 PG (ref 26.6–33)
MCHC RBC AUTO-ENTMCNC: 33.7 G/DL (ref 31.5–35.7)
MCV RBC AUTO: 93 FL (ref 79–97)
MONOCYTES # BLD AUTO: 0.4 X10E3/UL (ref 0.1–0.9)
MONOCYTES NFR BLD AUTO: 8 %
NEUTROPHILS # BLD AUTO: 3.7 X10E3/UL (ref 1.4–7)
NEUTROPHILS NFR BLD AUTO: 62 %
PLATELET # BLD AUTO: 189 X10E3/UL (ref 150–450)
POTASSIUM SERPL-SCNC: 5.4 MMOL/L (ref 3.5–5.2)
PROT SERPL-MCNC: 7.1 G/DL (ref 6–8.5)
RBC # BLD AUTO: 6.09 X10E6/UL (ref 4.14–5.8)
SODIUM SERPL-SCNC: 142 MMOL/L (ref 134–144)
TACROLIMUS BLD LC/MS/MS-MCNC: 2.3 NG/ML (ref 2–20)
WBC # BLD AUTO: 5.9 X10E3/UL (ref 3.4–10.8)

## 2024-02-15 LAB
EXT ALBUMIN: 4.7
EXT ALKALINE PHOSPHATASE: 97
EXT ALT: 40
EXT AST: 23
EXT BASOPHIL%: 1
EXT BILIRUBIN TOTAL: 0.7
EXT BUN: 32
EXT CALCIUM: 10
EXT CHLORIDE: 102
EXT CO2: 24
EXT CREATININE: 1.21 MG/DL
EXT EGFR NO RACE VARIABLE: 70
EXT EOSINOPHIL%: 3
EXT GLUCOSE: 91
EXT HEMATOCRIT: 56.7
EXT HEMOGLOBIN: 19.1
EXT LYMPH%: 25
EXT MONOCYTES%: 8
EXT PLATELETS: 189
EXT POTASSIUM: 5.4
EXT PROTEIN TOTAL: 7.1
EXT SEGS%: 62
EXT SODIUM: 142 MMOL/L
EXT TACROLIMUS LVL: 2.3
EXT WBC: 5.9

## 2024-02-26 ENCOUNTER — PATIENT MESSAGE (OUTPATIENT)
Dept: TRANSPLANT | Facility: CLINIC | Age: 59
End: 2024-02-26
Payer: MEDICARE

## 2024-02-26 ENCOUNTER — TELEPHONE (OUTPATIENT)
Dept: TRANSPLANT | Facility: CLINIC | Age: 59
End: 2024-02-26
Payer: MEDICARE

## 2024-02-26 NOTE — TELEPHONE ENCOUNTER
Letter sent to patient stating: Your labs have been reviewed by your Transplant physician, due to your hemoglobin, please see your hematologist for management. Next labs from liver txp perspective due 5/13/2024        ----- Message from Mercedes Garcia MD sent at 2/24/2024  5:19 PM CST -----  The Labs are improved. Hemoglobin is rising- needs to see hematologist- can be seen locally - please let patient know.

## 2024-02-26 NOTE — LETTER
February 26, 2024    Shin Trevino  [address] [facility] [address]          Dear Shin Trevino:  MRN: [id number]    Your recent labs have been reviewed by your Transplant physician, due to your hemoglobin rising, please see your hematologist for management. Next labs from liver txp perspective due 5/13/2024.  There are no medicine changes.     If you cannot have your labs drawn on the scheduled date, it is your responsibility to call the transplant department to reschedule.  Please call [phone] and ask to speak to Jennifer LEACH -  for all scheduling requests.     Sincerely,  Yancy CATALANN, RN      Your Liver Transplant Coordinator    [facility]  [address] [place] [address]  [phone]

## 2024-05-10 ENCOUNTER — PATIENT MESSAGE (OUTPATIENT)
Dept: TRANSPLANT | Facility: CLINIC | Age: 59
End: 2024-05-10
Payer: MEDICARE

## 2024-05-13 DIAGNOSIS — Z94.4 STATUS POST LIVER TRANSPLANT: ICD-10-CM

## 2024-05-13 RX ORDER — TACROLIMUS 1 MG/1
2 CAPSULE ORAL EVERY 12 HOURS
Qty: 120 CAPSULE | Refills: 11 | Status: SHIPPED | OUTPATIENT
Start: 2024-05-13

## 2024-05-13 RX ORDER — MYCOPHENOLATE MOFETIL 250 MG/1
500 CAPSULE ORAL 2 TIMES DAILY
Qty: 120 CAPSULE | Refills: 11 | Status: SHIPPED | OUTPATIENT
Start: 2024-05-13

## 2024-05-13 RX ORDER — ASPIRIN 81 MG/1
81 TABLET ORAL DAILY
Qty: 90 TABLET | Refills: 3 | Status: SHIPPED | OUTPATIENT
Start: 2024-05-13 | End: 2025-05-13

## 2024-05-17 ENCOUNTER — PATIENT MESSAGE (OUTPATIENT)
Dept: TRANSPLANT | Facility: CLINIC | Age: 59
End: 2024-05-17
Payer: MEDICARE

## 2024-05-17 ENCOUNTER — TELEPHONE (OUTPATIENT)
Dept: TRANSPLANT | Facility: CLINIC | Age: 59
End: 2024-05-17
Payer: MEDICARE

## 2024-05-17 LAB
ALBUMIN SERPL-MCNC: 4.6 G/DL (ref 3.8–4.9)
ALBUMIN/GLOB SERPL: 1.9 {RATIO} (ref 1.2–2.2)
ALP SERPL-CCNC: 98 IU/L (ref 44–121)
ALT SERPL-CCNC: 33 IU/L (ref 0–44)
AST SERPL-CCNC: 28 IU/L (ref 0–40)
BASOPHILS # BLD AUTO: 0.1 X10E3/UL (ref 0–0.2)
BASOPHILS NFR BLD AUTO: 2 %
BILIRUB SERPL-MCNC: 0.9 MG/DL (ref 0–1.2)
BUN SERPL-MCNC: 35 MG/DL (ref 6–24)
BUN/CREAT SERPL: 25 (ref 9–20)
CALCIUM SERPL-MCNC: 9.5 MG/DL (ref 8.7–10.2)
CHLORIDE SERPL-SCNC: 100 MMOL/L (ref 96–106)
CO2 SERPL-SCNC: 22 MMOL/L (ref 20–29)
CREAT SERPL-MCNC: 1.39 MG/DL (ref 0.76–1.27)
EOSINOPHIL # BLD AUTO: 0.2 X10E3/UL (ref 0–0.4)
EOSINOPHIL NFR BLD AUTO: 4 %
ERYTHROCYTE [DISTWIDTH] IN BLOOD BY AUTOMATED COUNT: 12.5 % (ref 11.6–15.4)
EST. GFR  (NO RACE VARIABLE): 59 ML/MIN/1.73
EXT ALBUMIN: 4.6
EXT ALKALINE PHOSPHATASE: 98
EXT ALT: 33
EXT AST: 28
EXT BASOPHIL%: 2
EXT BILIRUBIN TOTAL: 0.9
EXT BUN: 35
EXT CALCIUM: 9.5
EXT CHLORIDE: 100
EXT CO2: 22
EXT CREATININE: 1.39 MG/DL
EXT EGFR NO RACE VARIABLE: 59
EXT EOSINOPHIL%: 4
EXT GLUCOSE: 85
EXT HEMATOCRIT: 56.4
EXT HEMOGLOBIN: 18.9
EXT LYMPH%: 31
EXT MONOCYTES%: 9
EXT PLATELETS: 185
EXT POTASSIUM: 5
EXT PROTEIN TOTAL: 7
EXT SEGS%: 54
EXT SODIUM: 138 MMOL/L
EXT TACROLIMUS LVL: 3
EXT WBC: 4.6
GLOBULIN SER CALC-MCNC: 2.4 G/DL (ref 1.5–4.5)
GLUCOSE SERPL-MCNC: 85 MG/DL (ref 70–99)
HCT VFR BLD AUTO: 56.4 % (ref 37.5–51)
HGB BLD-MCNC: 18.9 G/DL (ref 13–17.7)
IMM GRANULOCYTES # BLD AUTO: 0 X10E3/UL (ref 0–0.1)
IMM GRANULOCYTES NFR BLD AUTO: 0 %
LYMPHOCYTES # BLD AUTO: 1.4 X10E3/UL (ref 0.7–3.1)
LYMPHOCYTES NFR BLD AUTO: 31 %
MCH RBC QN AUTO: 30 PG (ref 26.6–33)
MCHC RBC AUTO-ENTMCNC: 33.5 G/DL (ref 31.5–35.7)
MCV RBC AUTO: 90 FL (ref 79–97)
MONOCYTES # BLD AUTO: 0.4 X10E3/UL (ref 0.1–0.9)
MONOCYTES NFR BLD AUTO: 9 %
NEUTROPHILS # BLD AUTO: 2.5 X10E3/UL (ref 1.4–7)
NEUTROPHILS NFR BLD AUTO: 54 %
PLATELET # BLD AUTO: 185 X10E3/UL (ref 150–450)
POTASSIUM SERPL-SCNC: 5 MMOL/L (ref 3.5–5.2)
PROT SERPL-MCNC: 7 G/DL (ref 6–8.5)
RBC # BLD AUTO: 6.3 X10E6/UL (ref 4.14–5.8)
SODIUM SERPL-SCNC: 138 MMOL/L (ref 134–144)
TACROLIMUS BLD LC/MS/MS-MCNC: 3 NG/ML (ref 2–20)
WBC # BLD AUTO: 4.6 X10E3/UL (ref 3.4–10.8)

## 2024-05-17 NOTE — LETTER
May 17, 2024    Shin Trevino  [address] [facility] [address]          Dear Shin Trevino:  MRN: [id number]    This is a follow up to your recent labs.  There are no medicine changes however per Dr. Garcia, your hemoglobin level is quite high, you need to see a Hematologist.  Please have your liver transplant labs drawn again on 9/9/24.      If you cannot have your labs drawn on the scheduled date, it is your responsibility to call the transplant department to reschedule.  Please call [phone] and ask to speak to Jennifer LEACH -  for all scheduling requests.     Sincerely,    Yancy CATALANN, RN    Your Liver Transplant Coordinator    [facility] [place] [facility]  [address] [place] [address]  [phone]

## 2024-05-17 NOTE — TELEPHONE ENCOUNTER
Letter and my chart message sent to patient stating: Your labs have been reviewed by your Transplant physician, please see hematologist due to elevated Hemoglobin leve.. Next labs due 9/9/24          ----- Message from Mercedes Garcia MD sent at 5/17/2024  4:53 PM CDT -----  The Labs are stable; hemoglobin quite high - needs to see hematologist- please let patient know.

## 2024-05-20 DIAGNOSIS — D58.2 ELEVATED HEMOGLOBIN: ICD-10-CM

## 2024-05-20 DIAGNOSIS — Z94.4 STATUS POST LIVER TRANSPLANT: Primary | ICD-10-CM

## 2024-07-25 ENCOUNTER — OFFICE VISIT (OUTPATIENT)
Dept: HEMATOLOGY/ONCOLOGY | Facility: CLINIC | Age: 59
End: 2024-07-25
Payer: MEDICARE

## 2024-07-25 ENCOUNTER — LAB VISIT (OUTPATIENT)
Dept: LAB | Facility: HOSPITAL | Age: 59
End: 2024-07-25
Payer: MEDICARE

## 2024-07-25 VITALS
RESPIRATION RATE: 18 BRPM | SYSTOLIC BLOOD PRESSURE: 154 MMHG | DIASTOLIC BLOOD PRESSURE: 77 MMHG | HEIGHT: 72 IN | OXYGEN SATURATION: 99 % | BODY MASS INDEX: 27.19 KG/M2 | TEMPERATURE: 98 F | WEIGHT: 200.75 LBS | HEART RATE: 61 BPM

## 2024-07-25 DIAGNOSIS — D75.1 POLYCYTHEMIA: ICD-10-CM

## 2024-07-25 DIAGNOSIS — F41.9 ANXIETY: Primary | Chronic | ICD-10-CM

## 2024-07-25 DIAGNOSIS — N18.30 CHRONIC RENAL IMPAIRMENT, STAGE 3 (MODERATE), UNSPECIFIED WHETHER STAGE 3A OR 3B CKD: ICD-10-CM

## 2024-07-25 DIAGNOSIS — I10 PRIMARY HYPERTENSION: Chronic | ICD-10-CM

## 2024-07-25 DIAGNOSIS — Z94.4 STATUS POST LIVER TRANSPLANT: ICD-10-CM

## 2024-07-25 DIAGNOSIS — D58.2 ELEVATED HEMOGLOBIN: ICD-10-CM

## 2024-07-25 LAB
ALBUMIN SERPL BCP-MCNC: 4.4 G/DL (ref 3.5–5.2)
ALP SERPL-CCNC: 104 U/L (ref 55–135)
ALT SERPL W/O P-5'-P-CCNC: 48 U/L (ref 10–44)
ANION GAP SERPL CALC-SCNC: 7 MMOL/L (ref 8–16)
AST SERPL-CCNC: 38 U/L (ref 10–40)
BASOPHILS # BLD AUTO: 0.09 K/UL (ref 0–0.2)
BASOPHILS NFR BLD: 1.3 % (ref 0–1.9)
BILIRUB SERPL-MCNC: 1 MG/DL (ref 0.1–1)
BUN SERPL-MCNC: 35 MG/DL (ref 6–20)
CALCIUM SERPL-MCNC: 9.8 MG/DL (ref 8.7–10.5)
CHLORIDE SERPL-SCNC: 104 MMOL/L (ref 95–110)
CO2 SERPL-SCNC: 27 MMOL/L (ref 23–29)
CREAT SERPL-MCNC: 1.4 MG/DL (ref 0.5–1.4)
DIFFERENTIAL METHOD BLD: ABNORMAL
EOSINOPHIL # BLD AUTO: 0.2 K/UL (ref 0–0.5)
EOSINOPHIL NFR BLD: 3.1 % (ref 0–8)
ERYTHROCYTE [DISTWIDTH] IN BLOOD BY AUTOMATED COUNT: 13.4 % (ref 11.5–14.5)
EST. GFR  (NO RACE VARIABLE): 58.3 ML/MIN/1.73 M^2
GLUCOSE SERPL-MCNC: 90 MG/DL (ref 70–110)
HCT VFR BLD AUTO: 56.4 % (ref 40–54)
HGB BLD-MCNC: 19.2 G/DL (ref 14–18)
IMM GRANULOCYTES # BLD AUTO: 0.01 K/UL (ref 0–0.04)
IMM GRANULOCYTES NFR BLD AUTO: 0.1 % (ref 0–0.5)
LDH SERPL L TO P-CCNC: 230 U/L (ref 110–260)
LYMPHOCYTES # BLD AUTO: 1.4 K/UL (ref 1–4.8)
LYMPHOCYTES NFR BLD: 21.3 % (ref 18–48)
MCH RBC QN AUTO: 30.5 PG (ref 27–31)
MCHC RBC AUTO-ENTMCNC: 34 G/DL (ref 32–36)
MCV RBC AUTO: 90 FL (ref 82–98)
MONOCYTES # BLD AUTO: 0.6 K/UL (ref 0.3–1)
MONOCYTES NFR BLD: 8.2 % (ref 4–15)
NEUTROPHILS # BLD AUTO: 4.4 K/UL (ref 1.8–7.7)
NEUTROPHILS NFR BLD: 66 % (ref 38–73)
NRBC BLD-RTO: 0 /100 WBC
PLATELET # BLD AUTO: 223 K/UL (ref 150–450)
PMV BLD AUTO: 11 FL (ref 9.2–12.9)
POTASSIUM SERPL-SCNC: 5.4 MMOL/L (ref 3.5–5.1)
PROT SERPL-MCNC: 7.6 G/DL (ref 6–8.4)
RBC # BLD AUTO: 6.3 M/UL (ref 4.6–6.2)
SODIUM SERPL-SCNC: 138 MMOL/L (ref 136–145)
TESTOST SERPL-MCNC: 1098 NG/DL (ref 304–1227)
WBC # BLD AUTO: 6.68 K/UL (ref 3.9–12.7)

## 2024-07-25 PROCEDURE — 84403 ASSAY OF TOTAL TESTOSTERONE: CPT | Performed by: INTERNAL MEDICINE

## 2024-07-25 PROCEDURE — 85025 COMPLETE CBC W/AUTO DIFF WBC: CPT | Performed by: INTERNAL MEDICINE

## 2024-07-25 PROCEDURE — 99205 OFFICE O/P NEW HI 60 MIN: CPT | Mod: S$PBB,,, | Performed by: INTERNAL MEDICINE

## 2024-07-25 PROCEDURE — 82668 ASSAY OF ERYTHROPOIETIN: CPT | Performed by: INTERNAL MEDICINE

## 2024-07-25 PROCEDURE — 82375 ASSAY CARBOXYHB QUANT: CPT | Performed by: INTERNAL MEDICINE

## 2024-07-25 PROCEDURE — 80053 COMPREHEN METABOLIC PANEL: CPT | Performed by: INTERNAL MEDICINE

## 2024-07-25 PROCEDURE — 99999 PR PBB SHADOW E&M-EST. PATIENT-LVL IV: CPT | Mod: PBBFAC,,, | Performed by: INTERNAL MEDICINE

## 2024-07-25 PROCEDURE — 99214 OFFICE O/P EST MOD 30 MIN: CPT | Mod: PBBFAC | Performed by: INTERNAL MEDICINE

## 2024-07-25 PROCEDURE — 83615 LACTATE (LD) (LDH) ENZYME: CPT | Performed by: INTERNAL MEDICINE

## 2024-07-25 NOTE — PROGRESS NOTES
CC: Polycythemia, haematology consultation    HPI:  Domenico Trevino is here for hematology consultation for post liver transplant polycythemia/ erythrocytosis.   He is on testosterone injections. He denies using other anabolic steroids/ danazol. No h/o use of diuretics.    Denies smoking or exposure to other smoke.  No s/s of sleep apnea.   No h/o DVT, PE, CVA, MI. No weight loss, pruritus, including aquagenic pruritus.  No headache, tinntius, or epistaxis.   Hematocrit was 31.5 in 2013. It has increased to 56.4 in may 2024.  WBC count, differentials, platelets all normal.       Review of Systems   Constitutional: Negative.  Negative for appetite change and unexpected weight change.   HENT: Negative.  Negative for mouth sores.    Eyes: Negative.  Negative for visual disturbance.   Respiratory: Negative.  Negative for cough and shortness of breath.    Cardiovascular: Negative.  Negative for chest pain.   Gastrointestinal: Negative.  Negative for abdominal pain and diarrhea.   Genitourinary: Negative.  Negative for frequency.   Musculoskeletal: Negative.  Negative for back pain.   Skin: Negative.  Negative for rash.   Neurological: Negative.  Negative for headaches.   Hematological:  Negative for adenopathy.   Psychiatric/Behavioral: Negative.           Review of patient's allergies indicates:  No Known Allergies       Current Outpatient Medications   Medication Sig    aspirin (ECOTRIN) 81 MG EC tablet Take 1 tablet (81 mg total) by mouth once daily.    cholecalciferol, vitamin D3, 125 mcg (5,000 unit) Tab Take by mouth once daily.    co-enzyme Q-10 30 mg capsule Take 30 mg by mouth 3 (three) times daily.    diphenhydrAMINE-acetaminophen (TYLENOL PM)  mg Tab Take 2 tablets by mouth nightly as needed.    magnesium oxide (MAG-OX) 400 mg tablet Take 1 tablet (400 mg total) by mouth 2 (two) times daily. (Patient taking differently: Take 400 mg by mouth every evening.)    melatonin 10 mg Cap Take by  mouth nightly as needed.    multivitamin (THERAGRAN) tablet Take 1 tablet by mouth once daily.    mycophenolate (CELLCEPT) 250 mg Cap Take 2 capsules (500 mg total) by mouth 2 (two) times daily.    omega 3-dha-epa-fish-turmeric (OMEGA MONOPURE CURCUMIN EC) 417 mg-120 mg- 276 mg-600 mg Cap Take by mouth once daily.    omega-3 fatty acids/fish oil (FISH OIL-OMEGA-3 FATTY ACIDS) 300-1,000 mg capsule Take by mouth once daily.    propranoloL (INDERAL) 10 MG tablet Take 1 tablet by mouth once daily.    tacrolimus (PROGRAF) 1 MG Cap Take 2 capsules (2 mg total) by mouth every 12 (twelve) hours.    testosterone cypionate (DEPOTESTOTERONE CYPIONATE) 200 mg/mL injection SMARTSI Milliliter(s) IM Every 3 Weeks    vitamin K2 100 mcg Cap Take by mouth once daily.    zinc 50 mg Tab Take by mouth once daily.     No current facility-administered medications for this visit.      Objective:         Vitals:    24 1500   BP: (!) 154/77   Pulse: 61   Resp: 18   Temp: 97.5 °F (36.4 °C)    SpO2 99% on RA      Component      Latest Ref Rng 2013 2013 2013 2014 12/10/2014   WBC      3.4 - 10.8 x10E3/uL 5.94  2.61 (L)  5.35  4.41  5.06    RBC      4.14 - 5.80 x10E6/uL 3.50 (L)  4.19 (L)  3.94 (L)  4.47 (L)  4.84    Hemoglobin      13.0 - 17.7 g/dL 10.1 (L)  11.9 (L)  12.1 (L)  13.4 (L)  14.9    Hematocrit      37.5 - 51.0 % 31.5 (L)  35.5 (L)  34.5 (L)  39.2 (L)  42.3    MCV      79 - 97 fL 90.0  85  88  88  87    MCH      26.6 - 33.0 pg 28.9  28.4  30.7  30.0  30.8    MCHC      31.5 - 35.7 g/dL 32.1  33.5  35.1  34.2  35.2    RDW      11.6 - 15.4 % 15.8 (H)  15.4 (H)  12.9  12.9  12.8    Platelet Count      150 - 450 x10E3/uL 223  152  142 (L)  127 (L)  116 (L)    MPV      9.2 - 12.9 fL 9.2  10.4  11.4  11.8  12.6    Immature Granulocytes      Not Estab. %        Gran # (ANC)      1.8 - 7.7 K/uL 4.2  1.2 (L)  2.0  1.8  2.7    Immature Grans (Abs)      0.0 - 0.1 x10E3/uL        Lymph #      0.7 - 3.1  x10E3/uL 0.6 (L)  0.9 (L)  2.8  2.0  1.8    Mono #      0.1 - 0.9 x10E3/uL 0.5  0.3  0.4  0.5  0.4    Eos #      0.0 - 0.4 x10E3/uL 0.3  0.1  0.2  0.1  0.1    Baso #      0.0 - 0.2 x10E3/uL 0.3 (H)  0.09  0.04  0.04  0.05    nRBC      0 /100 WBC        Gran %      38.0 - 73.0 % 71.2  45.7  36.5 (L)  39.6  53.1    Lymph %      Not Estab. % 9.8 (L)  35.2  51.6 (H)  46.3  36.4    Mono %      Not Estab. % 8.8 (H)  10.3  8.2  10.9  7.7    Eos %      Not Estab. % 4.7 (H)  5.4  2.8  1.8  1.6    Basophil %      Not Estab. % 4.2 (H)  3.4 (H)  0.7  0.9  1.0    Differential Method  Automated  Automated  Automated  Automated    Neutrophils      Not Estab. %        Neutrophils, Abs      1.4 - 7.0 x10E3/uL        WBC        Neutrophils Manual        Lymphocytes %      18 - 52 %        Monocytes        Lymphocytes Absolute      /µL        Monocytes Absolute Count        Eosinophils Absolute      /µL        Basophils Absolute      /µL        Glucose      70 - 99 mg/dL        BUN      6 - 24 mg/dL        Creatinine      0.76 - 1.27 mg/dL        eGFR      >59 mL/min/1.73        BUN/CREAT RATIO      9 - 20         Sodium      134 - 144 mmol/L        Potassium      3.5 - 5.2 mmol/L        Chloride      96 - 106 mmol/L        CO2      20 - 29 mmol/L        Calcium      8.7 - 10.2 mg/dL        PROTEIN TOTAL      6.0 - 8.5 g/dL        Albumin      3.8 - 4.9 g/dL        Globulin, Total      1.5 - 4.5 g/dL        Albumin/Globulin Ratio      1.2 - 2.2         BILIRUBIN TOTAL      0.0 - 1.2 mg/dL        ALP      44 - 121 IU/L        AST      0 - 40 IU/L        ALT      0 - 44 IU/L          Component      Latest Ref Rng 6/15/2015 1/25/2016 2/21/2022 2/12/2024 5/14/2024   WBC      3.4 - 10.8 x10E3/uL 4.83   5.96  5.9  4.6    RBC      4.14 - 5.80 x10E6/uL 5.28  5.36 (E) 5.51  6.09 (H)  6.30 (H)    Hemoglobin      13.0 - 17.7 g/dL 15.9  16.2 (E) 17.1  19.1 (H)  18.9 (H)    Hematocrit      37.5 - 51.0 % 45.0  47 (E) 50.5  56.7 (H)  56.4 (H)     MCV      79 - 97 fL 85  88.4 (E) 92  93  90    MCH      26.6 - 33.0 pg 30.1  30.2 (E) 31.0  31.4  30.0    MCHC      31.5 - 35.7 g/dL 35.3  34.1 (E) 33.9  33.7  33.5    RDW      11.6 - 15.4 % 12.8  12.9 (E) 12.2  12.8  12.5    Platelet Count      150 - 450 x10E3/uL 137 (L)  117 (E) 167  189  185    MPV      9.2 - 12.9 fL 12.1  10.5 (E) 11.3      Immature Granulocytes      Not Estab. %   0.3  1  0    Gran # (ANC)      1.8 - 7.7 K/uL 2.6   3.7      Immature Grans (Abs)      0.0 - 0.1 x10E3/uL   0.02  0.0  0.0    Lymph #      0.7 - 3.1 x10E3/uL 1.7   1.5  1.5  1.4    Mono #      0.1 - 0.9 x10E3/uL 0.4   0.5  0.4  0.4    Eos #      0.0 - 0.4 x10E3/uL 0.1   0.1  0.2  0.2    Baso #      0.0 - 0.2 x10E3/uL 0.03   0.07  0.1  0.1    nRBC      0 /100 WBC   0      Gran %      38.0 - 73.0 % 53.8   61.8      Lymph %      Not Estab. % 34.2   25.8  25  31    Mono %      Not Estab. % 8.7   8.9  8  9    Eos %      Not Estab. % 2.5  3.0 (E) 2.0  3  4    Basophil %      Not Estab. % 0.6  1 (E) 1.2  1  2    Differential Method Automated   Automated      Neutrophils      Not Estab. %    62  54    Neutrophils, Abs      1.4 - 7.0 x10E3/uL  3 (E)  3.7  2.5    WBC  5.2 (E)      Neutrophils Manual  60 (E)      Lymphocytes %      18 - 52 %  27 (E)      Monocytes  9.1 (E)      Lymphocytes Absolute      /µL  1 (E)      Monocytes Absolute Count  0.50 (E)      Eosinophils Absolute      /µL  0 (E)      Basophils Absolute      /µL  0 (E)      Glucose      70 - 99 mg/dL     85    BUN      6 - 24 mg/dL     35 (H)    Creatinine      0.76 - 1.27 mg/dL     1.39 (H)    eGFR      >59 mL/min/1.73     59 (L)    BUN/CREAT RATIO      9 - 20      25 (H)    Sodium      134 - 144 mmol/L     138    Potassium      3.5 - 5.2 mmol/L     5.0    Chloride      96 - 106 mmol/L     100    CO2      20 - 29 mmol/L     22    Calcium      8.7 - 10.2 mg/dL     9.5    PROTEIN TOTAL      6.0 - 8.5 g/dL     7.0    Albumin      3.8 - 4.9 g/dL     4.6    Globulin, Total      1.5  - 4.5 g/dL     2.4    Albumin/Globulin Ratio      1.2 - 2.2      1.9    BILIRUBIN TOTAL      0.0 - 1.2 mg/dL     0.9    ALP      44 - 121 IU/L     98    AST      0 - 40 IU/L     28    ALT      0 - 44 IU/L     33       Legend:  (L) Low  (H) High  (E) External lab result      Assessment/Plan:     1. Polycythemia  2. Erythrocytosis  3. History of liver transplant  4. CKD      Plan:    1,2: Likely secondary to testosterone use. He will have serum EPO, serum testosterone checked today. He also has sleep quality issues at night and recommend a sleep evaluation.  He has normal SpO2 today.   Recommend holding testosterone if hematocrit is over 54 and resuming at half the dose after hematocrit is < 54.       3. S/p liver transplant :on prograf and cellcept , follows with liver transplant clinic    4. He is following with his PCP and liver transplant teams        BMT Chart Routing      Follow up with physician 6 months. with benign heme MD or NP   Follow up with JENNIFER    Provider visit type    Infusion scheduling note    Injection scheduling note    Labs CBC, CMP, LDH and other   Scheduling:  Preferred lab:  Lab interval:  carbon monoxide, serum EPO level, etstosterone today; cbc every 8 weeks at [facility] in [place], [place]   Imaging    Pharmacy appointment    Other referrals                 Angela Rdz MD

## 2024-07-26 ENCOUNTER — PATIENT MESSAGE (OUTPATIENT)
Dept: HEMATOLOGY/ONCOLOGY | Facility: CLINIC | Age: 59
End: 2024-07-26
Payer: MEDICARE

## 2024-07-26 DIAGNOSIS — D75.1 POLYCYTHEMIA: Primary | ICD-10-CM

## 2024-07-29 LAB — EPO SERPL-ACNC: 13 MIU/ML (ref 2.6–18.5)

## 2024-07-31 ENCOUNTER — PATIENT MESSAGE (OUTPATIENT)
Dept: TRANSPLANT | Facility: CLINIC | Age: 59
End: 2024-07-31
Payer: MEDICARE

## 2024-07-31 LAB — MAYO MISCELLANEOUS RESULT (REF): NORMAL

## 2024-08-08 ENCOUNTER — PATIENT MESSAGE (OUTPATIENT)
Dept: TRANSPLANT | Facility: CLINIC | Age: 59
End: 2024-08-08
Payer: MEDICARE

## 2024-08-08 LAB
EXT ALBUMIN: 4.6
EXT ALKALINE PHOSPHATASE: 98
EXT ALT: 47
EXT AST: 35
EXT BILIRUBIN TOTAL: 0.5
EXT BUN: 39
EXT CALCIUM: 9.9
EXT CHLORIDE: 102
EXT CO2: 25
EXT CREATININE: 1.4 MG/DL
EXT EOSINOPHIL%: 4
EXT GFR MDRD NON AF AMER: 57
EXT GLUCOSE: 99
EXT HEMATOCRIT: 54.9
EXT HEMOGLOBIN: 17.9
EXT LYMPH%: 23
EXT MONOCYTES%: 8
EXT PLATELETS: 179
EXT POTASSIUM: 5.6
EXT PROTEIN TOTAL: 6.9
EXT SEGS%: 64
EXT SODIUM: 140 MMOL/L
EXT TACROLIMUS LVL: ABNORMAL
EXT WBC: 5.5

## 2024-08-10 LAB — POTASSIUM SERPL-SCNC: 4.4 MMOL/L (ref 3.5–5.2)

## 2024-08-12 ENCOUNTER — TELEPHONE (OUTPATIENT)
Dept: TRANSPLANT | Facility: CLINIC | Age: 59
End: 2024-08-12
Payer: MEDICARE

## 2024-08-12 ENCOUNTER — PATIENT MESSAGE (OUTPATIENT)
Dept: TRANSPLANT | Facility: CLINIC | Age: 59
End: 2024-08-12
Payer: MEDICARE

## 2024-08-12 NOTE — TELEPHONE ENCOUNTER
----- Message from Mercedes Garcia MD sent at 8/11/2024 10:47 PM CDT -----  Potassium imporved - please let patient know.

## 2024-08-13 ENCOUNTER — PATIENT MESSAGE (OUTPATIENT)
Dept: TRANSPLANT | Facility: CLINIC | Age: 59
End: 2024-08-13
Payer: MEDICARE

## 2024-08-13 DIAGNOSIS — D58.2 ELEVATED HEMOGLOBIN: ICD-10-CM

## 2024-08-13 DIAGNOSIS — Z94.4 STATUS POST LIVER TRANSPLANT: Primary | ICD-10-CM

## 2024-08-13 RX ORDER — TACROLIMUS 1 MG/1
CAPSULE ORAL
Qty: 120 CAPSULE | Refills: 11 | Status: SHIPPED | OUTPATIENT
Start: 2024-08-13

## 2024-08-13 NOTE — TELEPHONE ENCOUNTER
Patient made aware of below lab review, referral placed and set for labs 9/2/24 at his [facility].        The Liver labs up a bit. Increase prograf to 3/2 from 2/2 and repeat labs in 1 month - please let patient know. Should see hematologist for elevated hemoglobin

## 2024-09-02 ENCOUNTER — PATIENT MESSAGE (OUTPATIENT)
Dept: TRANSPLANT | Facility: CLINIC | Age: 59
End: 2024-09-02
Payer: MEDICARE

## 2024-09-03 LAB
EXT ALBUMIN: 4.5
EXT ALKALINE PHOSPHATASE: 80
EXT ALT: 42
EXT AST: 38
EXT BASOPHIL%: 1
EXT BILIRUBIN TOTAL: 0.6
EXT BUN: 35
EXT CALCIUM: 9.8
EXT CHLORIDE: 101
EXT CO2: 21
EXT CREATININE: 1.64 MG/DL
EXT EGFR NO RACE VARIABLE: 48
EXT EOSINOPHIL%: 5
EXT GLUCOSE: 89
EXT HEMATOCRIT: 49.9
EXT HEMOGLOBIN: 16.6
EXT LYMPH%: 25
EXT MONOCYTES%: 7
EXT PLATELETS: 164
EXT POTASSIUM: 4.3
EXT PROTEIN TOTAL: 6.8
EXT SEGS%: 61
EXT SODIUM: 141 MMOL/L
EXT TACROLIMUS LVL: 3.7
EXT WBC: 5.1

## 2024-09-09 ENCOUNTER — TELEPHONE (OUTPATIENT)
Dept: TRANSPLANT | Facility: CLINIC | Age: 59
End: 2024-09-09
Payer: MEDICARE

## 2024-09-09 NOTE — LETTER
September 9, 2024    Shin Trevino  [address] [facility] [address]          Dear Shin Trevino:  MRN: [id number]    This is a follow up to your recent labs, your lab results were stable.  There are no medicine changes.  Please have your labs drawn again on 1/6/2025.      If you cannot have your labs drawn on the scheduled date, it is your responsibility to call the transplant department to reschedule.  Please call [phone] and ask to speak to Jennifer LEACH   for all scheduling requests.     Sincerely,    Yancy CATALANN, RN      Your Liver Transplant Coordinator    [facility]  [address] [place] [address]  [phone]

## 2024-09-09 NOTE — TELEPHONE ENCOUNTER
Letter sent to patient stating: Your labs have been reviewed by your Transplant physician, no action required. Next labs due 1/6/2025          ----- Message from Mercedes Garcia MD sent at 9/7/2024  9:49 PM CDT -----  The Labs are stable - please let patient know.

## 2025-01-01 ENCOUNTER — PATIENT MESSAGE (OUTPATIENT)
Dept: TRANSPLANT | Facility: CLINIC | Age: 60
End: 2025-01-01
Payer: MEDICARE

## 2025-01-16 ENCOUNTER — PATIENT MESSAGE (OUTPATIENT)
Dept: TRANSPLANT | Facility: CLINIC | Age: 60
End: 2025-01-16
Payer: MEDICARE

## 2025-02-03 LAB
EXT ALBUMIN: 4.8
EXT ALKALINE PHOSPHATASE: 106
EXT ALT: 45
EXT AST: 27
EXT BASOPHIL%: 2
EXT BILIRUBIN TOTAL: 0.6
EXT BUN: 35
EXT CALCIUM: 9.7
EXT CHLORIDE: 103
EXT CO2: 21
EXT CREATININE: 1.41 MG/DL
EXT EOSINOPHIL%: 4
EXT GFR MDRD NON AF AMER: 58
EXT GLUCOSE: 95
EXT HEMATOCRIT: 55.2
EXT HEMOGLOBIN: 18.6
EXT LYMPH%: 27
EXT MONOCYTES%: 8
EXT PLATELETS: 202
EXT POTASSIUM: 5.2
EXT PROTEIN TOTAL: 7
EXT SEGS%: 59
EXT SODIUM: 140 MMOL/L
EXT TACROLIMUS LVL: 8.7
EXT WBC: 4.2
PETH BLD-MCNC: NEGATIVE NG/ML

## 2025-02-06 DIAGNOSIS — Z94.4 STATUS POST LIVER TRANSPLANT: ICD-10-CM

## 2025-02-06 RX ORDER — TACROLIMUS 1 MG/1
CAPSULE ORAL
Qty: 120 CAPSULE | Refills: 11 | Status: SHIPPED | OUTPATIENT
Start: 2025-02-06

## 2025-02-06 NOTE — TELEPHONE ENCOUNTER
Portal message sent, repeat labs 3/3/25----- Message from Mercedes Garcia MD sent at 2/5/2025  5:15 PM CST -----  The Labs are stable; lower prograf to 2/2 from 3/2 and repeat labs in one month - please let patient know.

## 2025-03-05 ENCOUNTER — PATIENT MESSAGE (OUTPATIENT)
Dept: TRANSPLANT | Facility: CLINIC | Age: 60
End: 2025-03-05
Payer: MEDICARE

## 2025-03-10 ENCOUNTER — RESULTS FOLLOW-UP (OUTPATIENT)
Dept: HEPATOLOGY | Facility: CLINIC | Age: 60
End: 2025-03-10
Payer: MEDICARE

## 2025-03-10 NOTE — TELEPHONE ENCOUNTER
Message sent to patient via [facility] patient portal, Labs stable, no med changes, labs to be repeated on 7/7/2025.  Encouraged to call for needs.          ----- Message from Mercedes Garcia MD sent at 3/10/2025  8:28 AM CDT -----  The Labs are stable - please let patient know.  ----- Message -----  From: Chayo Granados  Sent: 3/10/2025   3:06 AM CDT  To: Mercedes Garcia MD

## 2025-04-09 ENCOUNTER — TELEPHONE (OUTPATIENT)
Dept: TRANSPLANT | Facility: CLINIC | Age: 60
End: 2025-04-09
Payer: MEDICARE

## 2025-06-11 ENCOUNTER — PATIENT MESSAGE (OUTPATIENT)
Dept: TRANSPLANT | Facility: CLINIC | Age: 60
End: 2025-06-11
Payer: MEDICARE

## 2025-06-11 DIAGNOSIS — Z94.4 STATUS POST LIVER TRANSPLANT: ICD-10-CM

## 2025-06-11 RX ORDER — MYCOPHENOLATE MOFETIL 250 MG/1
500 CAPSULE ORAL 2 TIMES DAILY
Qty: 120 CAPSULE | Refills: 11 | Status: SHIPPED | OUTPATIENT
Start: 2025-06-11

## 2025-06-11 RX ORDER — TACROLIMUS 1 MG/1
CAPSULE ORAL
Qty: 120 CAPSULE | Refills: 11 | Status: SHIPPED | OUTPATIENT
Start: 2025-06-11

## 2025-06-11 NOTE — TELEPHONE ENCOUNTER
Hello, [facility] . Would you please call them in? The phone number is [phone]. I believe they will fill 3 months at a time, at least they have in the past.  Thank you!  Shin

## 2025-06-20 DIAGNOSIS — Z94.4 STATUS POST LIVER TRANSPLANT: ICD-10-CM

## 2025-06-20 RX ORDER — MYCOPHENOLATE MOFETIL 250 MG/1
500 CAPSULE ORAL 2 TIMES DAILY
Qty: 120 CAPSULE | Refills: 11 | Status: SHIPPED | OUTPATIENT
Start: 2025-06-20

## 2025-06-20 NOTE — TELEPHONE ENCOUNTER
"  After long term waits [facility] in [place] and speaking with the [facility] rep there, called patient to discuss what this coordinator was told and how to move forward.  Explained the coordinator was informed the authorization for [facility] to provide care to the patient had actually  in November, therefore they could no longer fill prescriptions by Dr. Garcia.  Patient and coordinator discussed that patient had less than a week of Cellcept.  Offered to call script in to a local pharmacy.  Concern is cost. Patient stated to please call medication in to [facility] in [place].   Also, provided a "help line " number given to the coordinator by the [facility] rep.  [phone].    Patient stated he would call.  Patient very discouraged.  Offered patient that the coordinator would call any service needed to assist if necessary.                                     Me (Selected Message)        25 11:04 AM  The number I can find for the [facility] [place] is [phone].   The main number for the [facility] in [place] is [phone].  Thank you!  Me to Shin Trevino  AG      25 10:57 AM  Mr. Trevino,  There were [facility] to the [facility] on [facility]25.  The [facility] then sent me paperwork to fill out to justify your need for the medication and needed Dr. Garcia's signature. This was faxed to them and according to my fax it was received.  If you have a number I can call I am happy to call to see what the issue is.   Zenobia    Last read by Shin Trevino at 11:06AM on 2025.  Shin Trevino to Me        25 10:33 AM  I just called the [facility]'s automated system and requested a refill on the mycphenolate. I have no way of knowing whether they received your paperwork. From my end, all I can establish is that they received a prescription order for tacrolimus that expires 2025, which also appears to be a mistake. Is there a way for you to contact the [facility] and and correct the situation so " that I have a prescription for a year's supply of both tacrolimus and mycophenolate? If that is not possible, I will have to have prescriptions filled at a local pharmacy. I've not had this problem with the [facility] before, it's worrying.  Me to Shin Trevino        25  8:54 AM  Mr. Trevino I don't know why. I filled out the paperwork they sent me for both medications so I am perplexed. Please let me know what I can do to help.   Zenobia    Last read by Shin Trevino at 11:06AM on 2025.  Shin Trevino to Me        25 12:09 PM  Javier Rodriguez, I received a refill for my [facility]rolimus [facility] the [facility] but not the Mycophenolate. It might have gotten lost in their system? I have enough Mycophenolate for one more week. I also noticed the new Tacrolimus prescription expires in 3 months. In the past, they have filled a 90 day supply with the prescriptions being valid for one year. Thanks for your help!     Shin  Me to Shin Trevino        25 12:33 PM  Sending new prescriptions to your pharmacy Mr. Trevino    Last read by Shin Trevino at 10:57AM on 2025.    25 12:33 PM  You routed this conversation to Mercedes Garcia MD  [facility]    25 12:29 PM  Note  Hello, [facility] . Would you please call them in? The phone number is [phone]. I believe they will fill 3 months at a time, at least they have in the past.  Thank you!  Shin

## 2025-07-07 ENCOUNTER — TELEPHONE (OUTPATIENT)
Dept: TRANSPLANT | Facility: CLINIC | Age: 60
End: 2025-07-07
Payer: OTHER GOVERNMENT

## 2025-07-07 ENCOUNTER — PATIENT MESSAGE (OUTPATIENT)
Dept: TRANSPLANT | Facility: CLINIC | Age: 60
End: 2025-07-07
Payer: OTHER GOVERNMENT

## 2025-07-07 NOTE — LETTER
"   LAB ORDERS    2025-2026      ORDERING MD:   Dr. Mercedes Garcia MD, PhD  NPI# [id number]        Patient Name: Domenico Trevino               : 1965    [facility] Number: [id number]                         Please draw the following labs:     Test Frequency  Diagnosis/ICD-10 Code     CBC/DIFF/PLT every 3 months  Z94.4 Liver Transplant       Complete Metabolic Panel every 3 months  Z94.4 Liver Transplant         Prograf level every 3 months  Z94.4 Liver Transplant                  FAX RESULTS [phone] "[facility] Liver Transplant Coordinator", and send the hard copy when completed. If you have any questions regarding this request or need additional information, please call [phone].      "

## 2025-07-08 ENCOUNTER — TELEPHONE (OUTPATIENT)
Dept: TRANSPLANT | Facility: CLINIC | Age: 60
End: 2025-07-08
Payer: OTHER GOVERNMENT

## 2025-07-14 LAB
EXT ALBUMIN: 4.5
EXT ALKALINE PHOSPHATASE: 85
EXT ALT: 30
EXT AST: 25
EXT BASOPHIL%: 2
EXT BILIRUBIN TOTAL: 0.8
EXT BUN: 23
EXT CALCIUM: 9.7
EXT CHLORIDE: 103
EXT CO2: 23
EXT CREATININE: 1.21 MG/DL
EXT EGFR NO RACE VARIABLE: 69
EXT EOSINOPHIL%: 10
EXT GLUCOSE: 90
EXT HEMATOCRIT: 52.8
EXT HEMOGLOBIN: 17.8
EXT LYMPH%: 24
EXT MONOCYTES%: 8
EXT PLATELETS: 204
EXT POTASSIUM: 5.5
EXT PROTEIN TOTAL: 6.8
EXT SEGS%: 56
EXT SODIUM: 140 MMOL/L
EXT TACROLIMUS LVL: 3.8
EXT WBC: 5

## 2025-07-21 ENCOUNTER — RESULTS FOLLOW-UP (OUTPATIENT)
Dept: TRANSPLANT | Facility: CLINIC | Age: 60
End: 2025-07-21
Payer: OTHER GOVERNMENT

## 2025-07-23 NOTE — TELEPHONE ENCOUNTER
Patient messaged through the patient portal :      Mr. Trevino,  Dr. Garcia reviewed your labs and they are stable.  She made no changes to your medications. Please repeat your labs on November 3, 2025. Should you have any questions or concerns, please let me know.   Thanks  Zenobia        ----- Message from Mercedes Garcia MD sent at 7/21/2025 10:41 AM CDT -----  The Labs are stable - please let patient know.  ----- Message -----  From: Jennifer Esteban RN  Sent: 7/14/2025  11:30 AM CDT  To: Mercedes Garcia MD

## 2025-09-05 ENCOUNTER — TELEPHONE (OUTPATIENT)
Dept: TRANSPLANT | Facility: CLINIC | Age: 60
End: 2025-09-05
Payer: OTHER GOVERNMENT

## (undated) DEVICE — CLOSURE SKIN STERI STRIP 1/2X4

## (undated) DEVICE — SEE MEDLINE ITEM 157148

## (undated) DEVICE — BLADE SURG CARBON STEEL SZ11

## (undated) DEVICE — SUT 0 VICRYL / UR6 (J603)

## (undated) DEVICE — SEE MEDLINE ITEM 157117

## (undated) DEVICE — TRAY MINOR GEN SURG

## (undated) DEVICE — DRAPE CORETEMP FLD WRM 56X62IN

## (undated) DEVICE — NDL HYPO REG 25G X 1 1/2

## (undated) DEVICE — ELECTRODE REM PLYHSV RETURN 9

## (undated) DEVICE — DISSECTOR SPACEMAKER + 10-12MM

## (undated) DEVICE — STRAP SECURE 5MM

## (undated) DEVICE — SOL NS 1000CC

## (undated) DEVICE — TRAY FOLEY 16FR INFECTION CONT

## (undated) DEVICE — SUT MCRYL PLUS 4-0 PS2 27IN

## (undated) DEVICE — TUBING HF INSUFFLATION W/ FLTR